# Patient Record
Sex: FEMALE | Race: WHITE | NOT HISPANIC OR LATINO | Employment: UNEMPLOYED | URBAN - NONMETROPOLITAN AREA
[De-identification: names, ages, dates, MRNs, and addresses within clinical notes are randomized per-mention and may not be internally consistent; named-entity substitution may affect disease eponyms.]

---

## 2017-01-24 ENCOUNTER — APPOINTMENT (OUTPATIENT)
Dept: GENERAL RADIOLOGY | Facility: HOSPITAL | Age: 42
End: 2017-01-24

## 2017-01-24 ENCOUNTER — HOSPITAL ENCOUNTER (EMERGENCY)
Facility: HOSPITAL | Age: 42
Discharge: HOME | End: 2017-01-24
Attending: EMERGENCY MEDICINE | Admitting: EMERGENCY MEDICINE

## 2017-01-24 VITALS
HEIGHT: 66 IN | HEART RATE: 83 BPM | DIASTOLIC BLOOD PRESSURE: 82 MMHG | WEIGHT: 147 LBS | BODY MASS INDEX: 23.63 KG/M2 | RESPIRATION RATE: 18 BRPM | OXYGEN SATURATION: 100 % | TEMPERATURE: 98.7 F | SYSTOLIC BLOOD PRESSURE: 152 MMHG

## 2017-01-24 DIAGNOSIS — M54.12 CERVICAL RADICULOPATHY: ICD-10-CM

## 2017-01-24 DIAGNOSIS — L72.3 SEBACEOUS CYST: ICD-10-CM

## 2017-01-24 DIAGNOSIS — M54.2 NECK PAIN: Primary | ICD-10-CM

## 2017-01-24 PROCEDURE — 96372 THER/PROPH/DIAG INJ SC/IM: CPT

## 2017-01-24 PROCEDURE — 99283 EMERGENCY DEPT VISIT LOW MDM: CPT

## 2017-01-24 PROCEDURE — 25010000002 TRIAMCINOLONE PER 10 MG: Performed by: NURSE PRACTITIONER

## 2017-01-24 PROCEDURE — 72040 X-RAY EXAM NECK SPINE 2-3 VW: CPT

## 2017-01-24 RX ORDER — NAPROXEN 375 MG/1
375 TABLET ORAL 2 TIMES DAILY PRN
Qty: 14 TABLET | Refills: 0 | Status: SHIPPED | OUTPATIENT
Start: 2017-01-24 | End: 2017-10-06 | Stop reason: HOSPADM

## 2017-01-24 RX ORDER — CYCLOBENZAPRINE HCL 10 MG
10 TABLET ORAL 3 TIMES DAILY
Qty: 20 TABLET | Refills: 0 | Status: SHIPPED | OUTPATIENT
Start: 2017-01-24 | End: 2017-11-21

## 2017-01-24 RX ORDER — BUDESONIDE AND FORMOTEROL FUMARATE DIHYDRATE 160; 4.5 UG/1; UG/1
2 AEROSOL RESPIRATORY (INHALATION)
COMMUNITY

## 2017-01-24 RX ORDER — METHYLPREDNISOLONE 4 MG/1
TABLET ORAL
Qty: 21 TABLET | Refills: 0 | Status: SHIPPED | OUTPATIENT
Start: 2017-01-24 | End: 2017-10-06 | Stop reason: HOSPADM

## 2017-01-24 RX ORDER — CYCLOBENZAPRINE HCL 10 MG
10 TABLET ORAL ONCE
Status: COMPLETED | OUTPATIENT
Start: 2017-01-24 | End: 2017-01-24

## 2017-01-24 RX ORDER — ALBUTEROL SULFATE 90 UG/1
2 AEROSOL, METERED RESPIRATORY (INHALATION) DAILY
COMMUNITY
End: 2018-01-02 | Stop reason: SDUPTHER

## 2017-01-24 RX ORDER — TRIAMCINOLONE ACETONIDE 40 MG/ML
80 INJECTION, SUSPENSION INTRA-ARTICULAR; INTRAMUSCULAR ONCE
Status: COMPLETED | OUTPATIENT
Start: 2017-01-24 | End: 2017-01-24

## 2017-01-24 RX ADMIN — TRIAMCINOLONE ACETONIDE 80 MG: 40 INJECTION, SUSPENSION INTRA-ARTICULAR; INTRAMUSCULAR at 13:35

## 2017-01-24 RX ADMIN — CYCLOBENZAPRINE HYDROCHLORIDE 10 MG: 10 TABLET, FILM COATED ORAL at 13:35

## 2017-01-24 NOTE — DISCHARGE INSTRUCTIONS
Follow-up with a local surgeon if removal of the sebaceous cyst becomes pertinent.  Medications as directed for the neck pain and subsequent arm symptoms.  Follow-up with your primary care provider to monitor your recovery.  If symptoms persist may start the Medrol/Dosepak on Thursday morning.  Thank you

## 2017-01-24 NOTE — ED PROVIDER NOTES
Subjective   HPI Comments: Patient presents to the emergency department with intermittent pain in the neck tripped he is he has and his right arm with occasional tingling.  Patient is curious if the symptoms can be attributed to the sebaceous cyst she has also discovered in the superior trapezius.  She has no loss of strength or other neurosensory complaints.  She denies any trauma.  Drums are made worse by sleeping at night sometimes and made better by nothing that she has recognized.      History provided by:  Patient      Review of Systems   Constitutional: Negative.  Negative for diaphoresis and fever.   HENT: Negative for sore throat.    Eyes: Negative.  Negative for pain and visual disturbance.   Respiratory: Negative for cough, shortness of breath, wheezing and stridor.    Cardiovascular: Negative.  Negative for chest pain.   Gastrointestinal: Negative.  Negative for abdominal pain, diarrhea, nausea and vomiting.   Endocrine: Negative.    Genitourinary: Negative.  Negative for dysuria.   Musculoskeletal: Positive for neck pain. Negative for back pain.   Skin: Negative.  Negative for pallor and rash.   Allergic/Immunologic: Negative.    Neurological: Positive for numbness. Negative for tremors, syncope, weakness, light-headedness and headaches.   Hematological: Negative.    Psychiatric/Behavioral: Negative.  Negative for agitation.   All other systems reviewed and are negative.      Past Medical History   Diagnosis Date   • Anemia    • Anxiety    • Asthma    • Blood clot in vein    • History of blood transfusion    • Hypertension    • Kidney disease    • Reflux esophagitis        No Known Allergies    Past Surgical History   Procedure Laterality Date   •  section     • Hysterectomy         Family History   Problem Relation Age of Onset   • Hypertension Mother    • Diabetes Father        Social History     Social History   • Marital status: Legally      Spouse name: N/A   • Number of children:  N/A   • Years of education: N/A     Social History Main Topics   • Smoking status: Current Every Day Smoker     Packs/day: 1.00     Types: Cigarettes   • Smokeless tobacco: Never Used   • Alcohol use No   • Drug use: No   • Sexual activity: Not Asked     Other Topics Concern   • None     Social History Narrative   • None           Objective   Physical Exam   Constitutional: She is oriented to person, place, and time. She appears well-developed.   HENT:   Head: Normocephalic.   Eyes: EOM are normal. Pupils are equal, round, and reactive to light.   Neck: Normal range of motion. Neck supple.   Cardiovascular: Normal rate and regular rhythm.    Pulmonary/Chest: Effort normal. She has no wheezes. She has no rales.   Abdominal: Soft. Bowel sounds are normal. She exhibits no distension. There is no rebound and no guarding.   Musculoskeletal: Normal range of motion. She exhibits no edema.   There is a deep and small sebaceous cyst palpable over the superior trapezius.  It is approximately 1 cm and there is no local redness fluctuance.  There is mild tenderness to the local cervical muscles without significant midline spine tenderness.  Neurovascular exam and motor exams are negative to the arms.   Neurological: She is alert and oriented to person, place, and time.   Skin: Skin is warm and dry.   Psychiatric: She has a normal mood and affect.   Nursing note and vitals reviewed.      Procedures         ED Course  ED Course                  MDM  Number of Diagnoses or Management Options  Cervical radiculopathy:   Sebaceous cyst:      Amount and/or Complexity of Data Reviewed  Tests in the radiology section of CPT®: ordered and reviewed        Final diagnoses:   Sebaceous cyst   Cervical radiculopathy            Gerri Burger, APRN  01/24/17 1411

## 2017-06-17 ENCOUNTER — APPOINTMENT (OUTPATIENT)
Dept: GENERAL RADIOLOGY | Facility: HOSPITAL | Age: 42
End: 2017-06-17

## 2017-06-17 ENCOUNTER — HOSPITAL ENCOUNTER (EMERGENCY)
Facility: HOSPITAL | Age: 42
Discharge: HOME | End: 2017-06-17
Attending: EMERGENCY MEDICINE | Admitting: EMERGENCY MEDICINE

## 2017-06-17 VITALS
BODY MASS INDEX: 25.71 KG/M2 | DIASTOLIC BLOOD PRESSURE: 84 MMHG | TEMPERATURE: 98.4 F | HEART RATE: 88 BPM | HEIGHT: 66 IN | RESPIRATION RATE: 18 BRPM | SYSTOLIC BLOOD PRESSURE: 131 MMHG | OXYGEN SATURATION: 97 % | WEIGHT: 160 LBS

## 2017-06-17 DIAGNOSIS — J44.1 COPD EXACERBATION (HCC): ICD-10-CM

## 2017-06-17 DIAGNOSIS — J20.9 ACUTE BRONCHITIS, UNSPECIFIED ORGANISM: ICD-10-CM

## 2017-06-17 DIAGNOSIS — R07.89 CHEST WALL PAIN: Primary | ICD-10-CM

## 2017-06-17 LAB
ALBUMIN SERPL-MCNC: 4.5 G/DL (ref 3.5–5)
ALBUMIN/GLOB SERPL: 1.6 G/DL (ref 1–2)
ALP SERPL-CCNC: 54 U/L (ref 38–126)
ALT SERPL W P-5'-P-CCNC: 18 U/L (ref 13–69)
ANION GAP SERPL CALCULATED.3IONS-SCNC: 17.7 MMOL/L
AST SERPL-CCNC: 17 U/L (ref 15–46)
BASOPHILS # BLD AUTO: 0.09 10*3/MM3 (ref 0–0.2)
BASOPHILS NFR BLD AUTO: 0.7 % (ref 0–2.5)
BILIRUB SERPL-MCNC: 0.5 MG/DL (ref 0.2–1.3)
BUN BLD-MCNC: 11 MG/DL (ref 7–20)
BUN/CREAT SERPL: 10 (ref 7.1–23.5)
CALCIUM SPEC-SCNC: 9.2 MG/DL (ref 8.4–10.2)
CHLORIDE SERPL-SCNC: 107 MMOL/L (ref 98–107)
CO2 SERPL-SCNC: 22 MMOL/L (ref 26–30)
CREAT BLD-MCNC: 1.1 MG/DL (ref 0.6–1.3)
DEPRECATED RDW RBC AUTO: 42.7 FL (ref 37–54)
EOSINOPHIL # BLD AUTO: 0.13 10*3/MM3 (ref 0–0.7)
EOSINOPHIL NFR BLD AUTO: 1 % (ref 0–7)
ERYTHROCYTE [DISTWIDTH] IN BLOOD BY AUTOMATED COUNT: 12.4 % (ref 11.5–14.5)
GFR SERPL CREATININE-BSD FRML MDRD: 54 ML/MIN/1.73
GLOBULIN UR ELPH-MCNC: 2.9 GM/DL
GLUCOSE BLD-MCNC: 110 MG/DL (ref 74–98)
HCT VFR BLD AUTO: 42.8 % (ref 37–47)
HGB BLD-MCNC: 14.5 G/DL (ref 12–16)
HOLD SPECIMEN: NORMAL
HOLD SPECIMEN: NORMAL
IMM GRANULOCYTES # BLD: 0.08 10*3/MM3 (ref 0–0.06)
IMM GRANULOCYTES NFR BLD: 0.6 % (ref 0–0.6)
LYMPHOCYTES # BLD AUTO: 4.22 10*3/MM3 (ref 0.6–3.4)
LYMPHOCYTES NFR BLD AUTO: 31.5 % (ref 10–50)
MCH RBC QN AUTO: 31.6 PG (ref 27–31)
MCHC RBC AUTO-ENTMCNC: 33.9 G/DL (ref 30–37)
MCV RBC AUTO: 93.2 FL (ref 81–99)
MONOCYTES # BLD AUTO: 0.76 10*3/MM3 (ref 0–0.9)
MONOCYTES NFR BLD AUTO: 5.7 % (ref 0–12)
NEUTROPHILS # BLD AUTO: 8.11 10*3/MM3 (ref 2–6.9)
NEUTROPHILS NFR BLD AUTO: 60.5 % (ref 37–80)
NRBC BLD MANUAL-RTO: 0 /100 WBC (ref 0–0)
PLATELET # BLD AUTO: 221 10*3/MM3 (ref 130–400)
PMV BLD AUTO: 10.6 FL (ref 6–12)
POTASSIUM BLD-SCNC: 3.7 MMOL/L (ref 3.5–5.1)
PROT SERPL-MCNC: 7.4 G/DL (ref 6.3–8.2)
RBC # BLD AUTO: 4.59 10*6/MM3 (ref 4.2–5.4)
SODIUM BLD-SCNC: 143 MMOL/L (ref 137–145)
TROPONIN I SERPL-MCNC: 0 NG/ML (ref 0–0.05)
TROPONIN I SERPL-MCNC: <0.012 NG/ML (ref 0–0.03)
WBC NRBC COR # BLD: 13.39 10*3/MM3 (ref 4.8–10.8)
WHOLE BLOOD HOLD SPECIMEN: NORMAL
WHOLE BLOOD HOLD SPECIMEN: NORMAL

## 2017-06-17 PROCEDURE — 94640 AIRWAY INHALATION TREATMENT: CPT

## 2017-06-17 PROCEDURE — 25010000002 METHYLPREDNISOLONE PER 40 MG: Performed by: PHYSICIAN ASSISTANT

## 2017-06-17 PROCEDURE — 99283 EMERGENCY DEPT VISIT LOW MDM: CPT

## 2017-06-17 PROCEDURE — 96375 TX/PRO/DX INJ NEW DRUG ADDON: CPT

## 2017-06-17 PROCEDURE — 25010000002 KETOROLAC TROMETHAMINE PER 15 MG: Performed by: PHYSICIAN ASSISTANT

## 2017-06-17 PROCEDURE — 84484 ASSAY OF TROPONIN QUANT: CPT | Performed by: EMERGENCY MEDICINE

## 2017-06-17 PROCEDURE — 94799 UNLISTED PULMONARY SVC/PX: CPT

## 2017-06-17 PROCEDURE — 93005 ELECTROCARDIOGRAM TRACING: CPT | Performed by: EMERGENCY MEDICINE

## 2017-06-17 PROCEDURE — 71010 HC CHEST PA OR AP: CPT

## 2017-06-17 PROCEDURE — 85025 COMPLETE CBC W/AUTO DIFF WBC: CPT | Performed by: EMERGENCY MEDICINE

## 2017-06-17 PROCEDURE — 80053 COMPREHEN METABOLIC PANEL: CPT | Performed by: EMERGENCY MEDICINE

## 2017-06-17 PROCEDURE — 96374 THER/PROPH/DIAG INJ IV PUSH: CPT

## 2017-06-17 RX ORDER — PREDNISONE 20 MG/1
60 TABLET ORAL DAILY
Qty: 12 TABLET | Refills: 0 | Status: SHIPPED | OUTPATIENT
Start: 2017-06-17 | End: 2017-10-06 | Stop reason: HOSPADM

## 2017-06-17 RX ORDER — IPRATROPIUM BROMIDE AND ALBUTEROL SULFATE 2.5; .5 MG/3ML; MG/3ML
3 SOLUTION RESPIRATORY (INHALATION) ONCE
Status: COMPLETED | OUTPATIENT
Start: 2017-06-17 | End: 2017-06-17

## 2017-06-17 RX ORDER — AZITHROMYCIN 250 MG/1
250 TABLET, FILM COATED ORAL DAILY
Qty: 6 TABLET | Refills: 0 | Status: SHIPPED | OUTPATIENT
Start: 2017-06-17 | End: 2017-10-06 | Stop reason: HOSPADM

## 2017-06-17 RX ORDER — KETOROLAC TROMETHAMINE 30 MG/ML
30 INJECTION, SOLUTION INTRAMUSCULAR; INTRAVENOUS ONCE
Status: COMPLETED | OUTPATIENT
Start: 2017-06-17 | End: 2017-06-17

## 2017-06-17 RX ORDER — MELOXICAM 15 MG/1
15 TABLET ORAL DAILY
Qty: 7 TABLET | Refills: 0 | Status: SHIPPED | OUTPATIENT
Start: 2017-06-17 | End: 2017-11-21

## 2017-06-17 RX ORDER — ASPIRIN 325 MG
325 TABLET ORAL ONCE
Status: DISCONTINUED | OUTPATIENT
Start: 2017-06-17 | End: 2017-06-17

## 2017-06-17 RX ORDER — METHYLPREDNISOLONE SODIUM SUCCINATE 40 MG/ML
80 INJECTION, POWDER, LYOPHILIZED, FOR SOLUTION INTRAMUSCULAR; INTRAVENOUS ONCE
Status: COMPLETED | OUTPATIENT
Start: 2017-06-17 | End: 2017-06-17

## 2017-06-17 RX ORDER — SODIUM CHLORIDE 0.9 % (FLUSH) 0.9 %
10 SYRINGE (ML) INJECTION AS NEEDED
Status: DISCONTINUED | OUTPATIENT
Start: 2017-06-17 | End: 2017-06-17 | Stop reason: HOSPADM

## 2017-06-17 RX ADMIN — IPRATROPIUM BROMIDE AND ALBUTEROL SULFATE 3 ML: .5; 3 SOLUTION RESPIRATORY (INHALATION) at 15:42

## 2017-06-17 RX ADMIN — KETOROLAC TROMETHAMINE 30 MG: 30 INJECTION, SOLUTION INTRAMUSCULAR at 16:25

## 2017-06-17 RX ADMIN — METHYLPREDNISOLONE SODIUM SUCCINATE 80 MG: 40 INJECTION, POWDER, FOR SOLUTION INTRAMUSCULAR; INTRAVENOUS at 16:24

## 2017-06-17 NOTE — DISCHARGE INSTRUCTIONS
Continue your inhalers.  Discontinue cigarettes.  Return to the ER for worsening chest pain, worsening shortness of breath, passing out, new or worsening symptoms.  Follow-up with your doctor on Monday for further workup, treatment and evaluation.

## 2017-06-17 NOTE — ED PROVIDER NOTES
Subjective   HPI Comments: 41-year-old white female with no previous cardiac history other than a negative nuclear stress test in August 2016 with Dr. Curtis.  I have reviewed the results myself.  She does have a history of COPD and continued tobacco use.  She is here with a several year history of cough, worse for the past 5 days.  She is coughing up green, clear and yellow sputum as well as short of breath and having some right-sided chest pressure-like discomfort for the past 5 days constant in nature.  Worse with deep breathing and coughing.  The pain radiates across her sternum into her left chest since yesterday.  The chest discomfort is mild to moderate in severity.  No arm, back or neck radiation.  Some associated shortness of breath without nausea, vomiting or diaphoresis.  Her cardiac risk factors include smoking, hypertension.  No history of DVT or PE.  Status post hysterectomy.    Aggravating factors: Coughing.  Alleviating factors: None.  Treatment prior to arrival: Albuterol and Symbicort inhalers.      History provided by:  Patient  History limited by: nothing.   used: No        Review of Systems   Constitutional: Negative.    HENT: Negative.    Eyes: Negative.    Respiratory: Positive for cough, shortness of breath and wheezing.    Cardiovascular: Positive for chest pain.   Gastrointestinal: Negative.  Negative for abdominal pain.   Endocrine: Negative.    Genitourinary: Negative for dysuria.   Musculoskeletal: Negative.    Skin: Negative.    Allergic/Immunologic: Negative.    Neurological: Negative.    Hematological: Negative.    Psychiatric/Behavioral: Negative.    All other systems reviewed and are negative.      Past Medical History:   Diagnosis Date   • Anemia    • Anxiety    • Asthma    • Blood clot in vein    • History of blood transfusion    • Hypertension    • Kidney disease    • Reflux esophagitis        No Known Allergies    Past Surgical History:   Procedure  Laterality Date   •  SECTION     • HYSTERECTOMY         Family History   Problem Relation Age of Onset   • Hypertension Mother    • Diabetes Father        Social History     Social History   • Marital status: Legally      Spouse name: N/A   • Number of children: N/A   • Years of education: N/A     Social History Main Topics   • Smoking status: Current Every Day Smoker     Packs/day: 1.00     Types: Cigarettes   • Smokeless tobacco: Never Used   • Alcohol use No   • Drug use: No   • Sexual activity: Not Asked     Other Topics Concern   • None     Social History Narrative           Objective   Physical Exam   Constitutional: She is oriented to person, place, and time. She appears well-developed and well-nourished. No distress.   HENT:   Head: Normocephalic and atraumatic.   Right Ear: External ear normal.   Left Ear: External ear normal.   Eyes: EOM are normal. Pupils are equal, round, and reactive to light.   Neck: Normal range of motion. Neck supple.   Cardiovascular: Normal rate, regular rhythm and normal heart sounds.    Pulmonary/Chest: No stridor. She has wheezes. She exhibits tenderness.   The right chest wall is exquisitely tender to palpation as is the left side as well.  The patient is wheezing throughout.  She appears to be in mild respiratory distress.   Abdominal: Soft. She exhibits no distension. There is no tenderness. There is no rebound and no guarding.   Musculoskeletal: Normal range of motion. She exhibits no edema.   No calf tenderness or ankle edema.   Neurological: She is alert and oriented to person, place, and time.   Skin: Skin is warm and dry. No rash noted. She is not diaphoretic.   Psychiatric: She has a normal mood and affect. Her behavior is normal. Judgment and thought content normal.   Nursing note and vitals reviewed.      ECG 12 Lead    Date/Time: 2017 3:47 PM  Performed by: DEANDRE KNOX  Authorized by: SACHIN ALONSO   Comments: EKG: Sinus rhythm at rate  of 98.  No ischemia or infarction.  Normal axis and intervals.  No ectopy.               ED Course  ED Course   Comment By Time   Longer wheezing.  Chest pain is nearly gone.  Feels much better.  Clinically the patient has bronchitis, COPD exacerbation and chest wall pain.  We will treat for this.  She is perc rule negative.  We therefore did not order a d-dimer for that reason. Tray Nixon PA-C 06/17 1653                  MDM  Number of Diagnoses or Management Options  Acute bronchitis, unspecified organism: new and requires workup  Chest wall pain: new and requires workup  COPD exacerbation: new and requires workup  Diagnosis management comments: 5 days of constant chest pain with negative EKG and cardiac enzymes.  One set of cardiac enzymes is sufficient to rule out a myocardial infarction.  We'll have her follow-up with her family doctor on Monday for further workup, treatment and evaluation.       Amount and/or Complexity of Data Reviewed  Clinical lab tests: reviewed and ordered  Tests in the radiology section of CPT®: ordered and reviewed  Tests in the medicine section of CPT®: ordered and reviewed  Decide to obtain previous medical records or to obtain history from someone other than the patient: yes  Review and summarize past medical records: yes  Discuss the patient with other providers: yes  Independent visualization of images, tracings, or specimens: yes    Risk of Complications, Morbidity, and/or Mortality  Presenting problems: moderate  Diagnostic procedures: low  Management options: moderate    Patient Progress  Patient progress: stable      Final diagnoses:   Acute bronchitis, unspecified organism   Chest wall pain   COPD exacerbation            Tray Nixon PA-C  06/17/17 0415

## 2017-07-30 ENCOUNTER — HOSPITAL ENCOUNTER (EMERGENCY)
Facility: HOSPITAL | Age: 42
Discharge: HOME | End: 2017-07-30
Attending: EMERGENCY MEDICINE | Admitting: EMERGENCY MEDICINE

## 2017-07-30 VITALS
BODY MASS INDEX: 25.71 KG/M2 | RESPIRATION RATE: 18 BRPM | HEIGHT: 66 IN | HEART RATE: 95 BPM | WEIGHT: 160 LBS | TEMPERATURE: 98.5 F | SYSTOLIC BLOOD PRESSURE: 136 MMHG | DIASTOLIC BLOOD PRESSURE: 82 MMHG | OXYGEN SATURATION: 96 %

## 2017-07-30 DIAGNOSIS — T63.461A WASP STING, ACCIDENTAL OR UNINTENTIONAL, INITIAL ENCOUNTER: Primary | ICD-10-CM

## 2017-07-30 PROCEDURE — 99283 EMERGENCY DEPT VISIT LOW MDM: CPT

## 2017-07-30 PROCEDURE — 63710000001 DIPHENHYDRAMINE PER 50 MG: Performed by: EMERGENCY MEDICINE

## 2017-07-30 RX ORDER — FAMOTIDINE 20 MG/1
20 TABLET, FILM COATED ORAL ONCE
Status: COMPLETED | OUTPATIENT
Start: 2017-07-30 | End: 2017-07-30

## 2017-07-30 RX ORDER — PREDNISONE 20 MG/1
20 TABLET ORAL 2 TIMES DAILY
Qty: 6 TABLET | Refills: 0 | Status: SHIPPED | OUTPATIENT
Start: 2017-07-30 | End: 2017-08-02

## 2017-07-30 RX ORDER — CEPHALEXIN 500 MG/1
500 CAPSULE ORAL 3 TIMES DAILY
Qty: 21 CAPSULE | Refills: 0 | Status: SHIPPED | OUTPATIENT
Start: 2017-07-30 | End: 2017-08-06

## 2017-07-30 RX ORDER — DIPHENHYDRAMINE HCL 25 MG
25 CAPSULE ORAL ONCE
Status: COMPLETED | OUTPATIENT
Start: 2017-07-30 | End: 2017-07-30

## 2017-07-30 RX ADMIN — DIPHENHYDRAMINE HYDROCHLORIDE 25 MG: 25 CAPSULE ORAL at 10:30

## 2017-07-30 RX ADMIN — FAMOTIDINE 20 MG: 20 TABLET, FILM COATED ORAL at 10:29

## 2017-10-05 ENCOUNTER — HOSPITAL ENCOUNTER (INPATIENT)
Facility: HOSPITAL | Age: 42
LOS: 1 days | Discharge: HOME | End: 2017-10-06
Attending: EMERGENCY MEDICINE | Admitting: INTERNAL MEDICINE

## 2017-10-05 ENCOUNTER — APPOINTMENT (OUTPATIENT)
Dept: ULTRASOUND IMAGING | Facility: HOSPITAL | Age: 42
End: 2017-10-05

## 2017-10-05 ENCOUNTER — APPOINTMENT (OUTPATIENT)
Dept: CT IMAGING | Facility: HOSPITAL | Age: 42
End: 2017-10-05

## 2017-10-05 ENCOUNTER — APPOINTMENT (OUTPATIENT)
Dept: GENERAL RADIOLOGY | Facility: HOSPITAL | Age: 42
End: 2017-10-05

## 2017-10-05 DIAGNOSIS — N17.9 ACUTE RENAL INJURY (HCC): ICD-10-CM

## 2017-10-05 DIAGNOSIS — R11.2 NON-INTRACTABLE VOMITING WITH NAUSEA, UNSPECIFIED VOMITING TYPE: Primary | ICD-10-CM

## 2017-10-05 PROBLEM — F10.20 ALCOHOLISM (HCC): Status: ACTIVE | Noted: 2017-10-05

## 2017-10-05 PROBLEM — J44.9 COPD (CHRONIC OBSTRUCTIVE PULMONARY DISEASE) (HCC): Status: ACTIVE | Noted: 2017-10-05

## 2017-10-05 PROBLEM — F41.9 ANXIETY AND DEPRESSION: Status: ACTIVE | Noted: 2017-10-05

## 2017-10-05 PROBLEM — F32.A ANXIETY AND DEPRESSION: Status: ACTIVE | Noted: 2017-10-05

## 2017-10-05 PROBLEM — I10 HTN (HYPERTENSION): Status: ACTIVE | Noted: 2017-10-05

## 2017-10-05 LAB
ALBUMIN SERPL-MCNC: 5.6 G/DL (ref 3.5–5)
ALBUMIN/GLOB SERPL: 1.4 G/DL (ref 1–2)
ALP SERPL-CCNC: 90 U/L (ref 38–126)
ALT SERPL W P-5'-P-CCNC: 31 U/L (ref 13–69)
AMORPH URATE CRY URNS QL MICRO: ABNORMAL /HPF
ANION GAP SERPL CALCULATED.3IONS-SCNC: 35.2 MMOL/L
APAP SERPL-MCNC: <10 MCG/ML
AST SERPL-CCNC: 34 U/L (ref 15–46)
BACTERIA UR QL AUTO: ABNORMAL /HPF
BASOPHILS # BLD AUTO: 0.09 10*3/MM3 (ref 0–0.2)
BASOPHILS NFR BLD AUTO: 0.5 % (ref 0–2.5)
BILIRUB SERPL-MCNC: 0.9 MG/DL (ref 0.2–1.3)
BUN BLD-MCNC: 24 MG/DL (ref 7–20)
BUN/CREAT SERPL: 12 (ref 7.1–23.5)
CALCIUM SPEC-SCNC: 11 MG/DL (ref 8.4–10.2)
CHLORIDE SERPL-SCNC: 99 MMOL/L (ref 98–107)
CO2 SERPL-SCNC: 6 MMOL/L (ref 26–30)
CREAT BLD-MCNC: 2 MG/DL (ref 0.6–1.3)
D-LACTATE SERPL-SCNC: 1 MMOL/L (ref 0.5–2)
DEPRECATED RDW RBC AUTO: 45.4 FL (ref 37–54)
EOSINOPHIL # BLD AUTO: 0.1 10*3/MM3 (ref 0–0.7)
EOSINOPHIL NFR BLD AUTO: 0.5 % (ref 0–7)
ERYTHROCYTE [DISTWIDTH] IN BLOOD BY AUTOMATED COUNT: 13.2 % (ref 11.5–14.5)
ETHANOL BLD-MCNC: <10 MG/DL
ETHANOL UR QL: <0.01 %
GFR SERPL CREATININE-BSD FRML MDRD: 27 ML/MIN/1.73
GLOBULIN UR ELPH-MCNC: 4 GM/DL
GLUCOSE BLD-MCNC: 168 MG/DL (ref 74–98)
HCT VFR BLD AUTO: 44.3 % (ref 37–47)
HGB BLD-MCNC: 15.1 G/DL (ref 12–16)
HOLD SPECIMEN: NORMAL
HOLD SPECIMEN: NORMAL
HYALINE CASTS UR QL AUTO: ABNORMAL /LPF
IMM GRANULOCYTES # BLD: 0.38 10*3/MM3 (ref 0–0.06)
IMM GRANULOCYTES NFR BLD: 2 % (ref 0–0.6)
LIPASE SERPL-CCNC: 404 U/L (ref 23–300)
LYMPHOCYTES # BLD AUTO: 2.66 10*3/MM3 (ref 0.6–3.4)
LYMPHOCYTES NFR BLD AUTO: 13.7 % (ref 10–50)
MCH RBC QN AUTO: 32.2 PG (ref 27–31)
MCHC RBC AUTO-ENTMCNC: 34.1 G/DL (ref 30–37)
MCV RBC AUTO: 94.5 FL (ref 81–99)
MONOCYTES # BLD AUTO: 1.08 10*3/MM3 (ref 0–0.9)
MONOCYTES NFR BLD AUTO: 5.6 % (ref 0–12)
NEUTROPHILS # BLD AUTO: 15.04 10*3/MM3 (ref 2–6.9)
NEUTROPHILS NFR BLD AUTO: 77.7 % (ref 37–80)
NRBC BLD MANUAL-RTO: 0 /100 WBC (ref 0–0)
PLATELET # BLD AUTO: 261 10*3/MM3 (ref 130–400)
PMV BLD AUTO: 11.4 FL (ref 6–12)
POTASSIUM BLD-SCNC: 5.2 MMOL/L (ref 3.5–5.1)
PROT SERPL-MCNC: 9.6 G/DL (ref 6.3–8.2)
RBC # BLD AUTO: 4.69 10*6/MM3 (ref 4.2–5.4)
RBC # UR: ABNORMAL /HPF
REF LAB TEST METHOD: ABNORMAL
SALICYLATES SERPL-MCNC: <1 MG/DL (ref 2.8–20)
SODIUM BLD-SCNC: 135 MMOL/L (ref 137–145)
SQUAMOUS #/AREA URNS HPF: ABNORMAL /HPF
TROPONIN I SERPL-MCNC: <0.012 NG/ML (ref 0–0.03)
WBC NRBC COR # BLD: 19.35 10*3/MM3 (ref 4.8–10.8)
WBC UR QL AUTO: ABNORMAL /HPF
WHOLE BLOOD HOLD SPECIMEN: NORMAL
WHOLE BLOOD HOLD SPECIMEN: NORMAL

## 2017-10-05 PROCEDURE — 84484 ASSAY OF TROPONIN QUANT: CPT | Performed by: EMERGENCY MEDICINE

## 2017-10-05 PROCEDURE — 25010000002 THIAMINE PER 100 MG: Performed by: INTERNAL MEDICINE

## 2017-10-05 PROCEDURE — 25010000002 LEVOFLOXACIN PER 250 MG: Performed by: INTERNAL MEDICINE

## 2017-10-05 PROCEDURE — 99223 1ST HOSP IP/OBS HIGH 75: CPT | Performed by: INTERNAL MEDICINE

## 2017-10-05 PROCEDURE — 99285 EMERGENCY DEPT VISIT HI MDM: CPT

## 2017-10-05 PROCEDURE — 93005 ELECTROCARDIOGRAM TRACING: CPT | Performed by: EMERGENCY MEDICINE

## 2017-10-05 PROCEDURE — 80053 COMPREHEN METABOLIC PANEL: CPT | Performed by: EMERGENCY MEDICINE

## 2017-10-05 PROCEDURE — 80307 DRUG TEST PRSMV CHEM ANLYZR: CPT | Performed by: EMERGENCY MEDICINE

## 2017-10-05 PROCEDURE — 81015 MICROSCOPIC EXAM OF URINE: CPT | Performed by: EMERGENCY MEDICINE

## 2017-10-05 PROCEDURE — 25010000002 MAGNESIUM SULFATE PER 500 MG OF MAGNESIUM: Performed by: INTERNAL MEDICINE

## 2017-10-05 PROCEDURE — 85025 COMPLETE CBC W/AUTO DIFF WBC: CPT | Performed by: EMERGENCY MEDICINE

## 2017-10-05 PROCEDURE — 83690 ASSAY OF LIPASE: CPT | Performed by: EMERGENCY MEDICINE

## 2017-10-05 PROCEDURE — 80307 DRUG TEST PRSMV CHEM ANLYZR: CPT | Performed by: INTERNAL MEDICINE

## 2017-10-05 PROCEDURE — 25010000002 ONDANSETRON PER 1 MG: Performed by: EMERGENCY MEDICINE

## 2017-10-05 PROCEDURE — 83605 ASSAY OF LACTIC ACID: CPT | Performed by: EMERGENCY MEDICINE

## 2017-10-05 PROCEDURE — 76705 ECHO EXAM OF ABDOMEN: CPT

## 2017-10-05 PROCEDURE — 25010000002 ENOXAPARIN PER 10 MG: Performed by: INTERNAL MEDICINE

## 2017-10-05 PROCEDURE — 25010000002 MORPHINE PER 10 MG: Performed by: EMERGENCY MEDICINE

## 2017-10-05 PROCEDURE — 71010 HC CHEST PA OR AP: CPT

## 2017-10-05 PROCEDURE — 74176 CT ABD & PELVIS W/O CONTRAST: CPT

## 2017-10-05 RX ORDER — ALBUTEROL SULFATE 2.5 MG/3ML
2.5 SOLUTION RESPIRATORY (INHALATION) EVERY 4 HOURS PRN
Status: DISCONTINUED | OUTPATIENT
Start: 2017-10-05 | End: 2017-10-06 | Stop reason: HOSPADM

## 2017-10-05 RX ORDER — ONDANSETRON 2 MG/ML
4 INJECTION INTRAMUSCULAR; INTRAVENOUS EVERY 6 HOURS PRN
Status: DISCONTINUED | OUTPATIENT
Start: 2017-10-05 | End: 2017-10-06 | Stop reason: HOSPADM

## 2017-10-05 RX ORDER — ONDANSETRON 2 MG/ML
4 INJECTION INTRAMUSCULAR; INTRAVENOUS ONCE
Status: COMPLETED | OUTPATIENT
Start: 2017-10-05 | End: 2017-10-05

## 2017-10-05 RX ORDER — MORPHINE SULFATE 2 MG/ML
2 INJECTION, SOLUTION INTRAMUSCULAR; INTRAVENOUS EVERY 4 HOURS PRN
Status: DISCONTINUED | OUTPATIENT
Start: 2017-10-05 | End: 2017-10-06 | Stop reason: HOSPADM

## 2017-10-05 RX ORDER — PANTOPRAZOLE SODIUM 40 MG/1
40 TABLET, DELAYED RELEASE ORAL DAILY
Status: DISCONTINUED | OUTPATIENT
Start: 2017-10-05 | End: 2017-10-06 | Stop reason: HOSPADM

## 2017-10-05 RX ORDER — LEVOFLOXACIN 5 MG/ML
500 INJECTION, SOLUTION INTRAVENOUS ONCE
Status: COMPLETED | OUTPATIENT
Start: 2017-10-05 | End: 2017-10-05

## 2017-10-05 RX ORDER — SODIUM BICARBONATE 650 MG/1
1300 TABLET ORAL 3 TIMES DAILY
Status: DISCONTINUED | OUTPATIENT
Start: 2017-10-05 | End: 2017-10-06 | Stop reason: HOSPADM

## 2017-10-05 RX ORDER — LORAZEPAM 0.5 MG/1
2 TABLET ORAL
Status: DISCONTINUED | OUTPATIENT
Start: 2017-10-05 | End: 2017-10-06 | Stop reason: HOSPADM

## 2017-10-05 RX ORDER — ONDANSETRON 2 MG/ML
4 INJECTION INTRAMUSCULAR; INTRAVENOUS ONCE
Status: DISCONTINUED | OUTPATIENT
Start: 2017-10-05 | End: 2017-10-06 | Stop reason: HOSPADM

## 2017-10-05 RX ORDER — SODIUM CHLORIDE 9 MG/ML
125 INJECTION, SOLUTION INTRAVENOUS CONTINUOUS
Status: DISCONTINUED | OUTPATIENT
Start: 2017-10-05 | End: 2017-10-05

## 2017-10-05 RX ORDER — LEVOFLOXACIN 5 MG/ML
250 INJECTION, SOLUTION INTRAVENOUS EVERY 24 HOURS
Status: DISCONTINUED | OUTPATIENT
Start: 2017-10-06 | End: 2017-10-06 | Stop reason: HOSPADM

## 2017-10-05 RX ORDER — MORPHINE SULFATE 4 MG/ML
4 INJECTION, SOLUTION INTRAMUSCULAR; INTRAVENOUS ONCE
Status: COMPLETED | OUTPATIENT
Start: 2017-10-05 | End: 2017-10-05

## 2017-10-05 RX ORDER — SODIUM CHLORIDE 0.9 % (FLUSH) 0.9 %
1-10 SYRINGE (ML) INJECTION AS NEEDED
Status: DISCONTINUED | OUTPATIENT
Start: 2017-10-05 | End: 2017-10-06 | Stop reason: HOSPADM

## 2017-10-05 RX ORDER — LEVOFLOXACIN 5 MG/ML
500 INJECTION, SOLUTION INTRAVENOUS EVERY 24 HOURS
Status: DISCONTINUED | OUTPATIENT
Start: 2017-10-05 | End: 2017-10-05

## 2017-10-05 RX ORDER — DIAZEPAM 5 MG/1
5 TABLET ORAL DAILY
Status: DISCONTINUED | OUTPATIENT
Start: 2017-10-05 | End: 2017-10-06 | Stop reason: HOSPADM

## 2017-10-05 RX ORDER — LORAZEPAM 0.5 MG/1
1 TABLET ORAL
Status: DISCONTINUED | OUTPATIENT
Start: 2017-10-05 | End: 2017-10-06 | Stop reason: HOSPADM

## 2017-10-05 RX ORDER — ACETAMINOPHEN 325 MG/1
650 TABLET ORAL EVERY 4 HOURS PRN
Status: DISCONTINUED | OUTPATIENT
Start: 2017-10-05 | End: 2017-10-06 | Stop reason: HOSPADM

## 2017-10-05 RX ORDER — ALBUTEROL SULFATE 90 UG/1
2 AEROSOL, METERED RESPIRATORY (INHALATION) EVERY 4 HOURS PRN
Status: DISCONTINUED | OUTPATIENT
Start: 2017-10-05 | End: 2017-10-05 | Stop reason: CLARIF

## 2017-10-05 RX ORDER — BUDESONIDE AND FORMOTEROL FUMARATE DIHYDRATE 160; 4.5 UG/1; UG/1
2 AEROSOL RESPIRATORY (INHALATION) DAILY
Status: DISCONTINUED | OUTPATIENT
Start: 2017-10-05 | End: 2017-10-06 | Stop reason: HOSPADM

## 2017-10-05 RX ORDER — LORAZEPAM 2 MG/ML
1 INJECTION INTRAMUSCULAR
Status: DISCONTINUED | OUTPATIENT
Start: 2017-10-05 | End: 2017-10-06 | Stop reason: HOSPADM

## 2017-10-05 RX ORDER — SODIUM CHLORIDE 9 MG/ML
150 INJECTION, SOLUTION INTRAVENOUS CONTINUOUS
Status: DISCONTINUED | OUTPATIENT
Start: 2017-10-05 | End: 2017-10-06 | Stop reason: HOSPADM

## 2017-10-05 RX ORDER — LORAZEPAM 2 MG/ML
2 INJECTION INTRAMUSCULAR
Status: DISCONTINUED | OUTPATIENT
Start: 2017-10-05 | End: 2017-10-06 | Stop reason: HOSPADM

## 2017-10-05 RX ORDER — NALOXONE HCL 0.4 MG/ML
0.4 VIAL (ML) INJECTION
Status: DISCONTINUED | OUTPATIENT
Start: 2017-10-05 | End: 2017-10-06 | Stop reason: HOSPADM

## 2017-10-05 RX ADMIN — SODIUM CHLORIDE 150 ML/HR: 9 INJECTION, SOLUTION INTRAVENOUS at 06:43

## 2017-10-05 RX ADMIN — LEVOFLOXACIN 500 MG: 5 INJECTION, SOLUTION INTRAVENOUS at 09:06

## 2017-10-05 RX ADMIN — ENOXAPARIN SODIUM 40 MG: 40 INJECTION SUBCUTANEOUS at 09:05

## 2017-10-05 RX ADMIN — SODIUM BICARBONATE: 84 INJECTION, SOLUTION INTRAVENOUS at 21:31

## 2017-10-05 RX ADMIN — ONDANSETRON 4 MG: 2 INJECTION INTRAMUSCULAR; INTRAVENOUS at 00:43

## 2017-10-05 RX ADMIN — SODIUM CHLORIDE 1000 ML: 9 INJECTION, SOLUTION INTRAVENOUS at 01:46

## 2017-10-05 RX ADMIN — PANTOPRAZOLE SODIUM 40 MG: 40 TABLET, DELAYED RELEASE ORAL at 09:05

## 2017-10-05 RX ADMIN — BUDESONIDE AND FORMOTEROL FUMARATE DIHYDRATE 2 PUFF: 160; 4.5 AEROSOL RESPIRATORY (INHALATION) at 11:37

## 2017-10-05 RX ADMIN — SODIUM CHLORIDE 125 ML/HR: 9 INJECTION, SOLUTION INTRAVENOUS at 03:59

## 2017-10-05 RX ADMIN — FOLIC ACID 100 ML/HR: 5 INJECTION, SOLUTION INTRAMUSCULAR; INTRAVENOUS; SUBCUTANEOUS at 09:05

## 2017-10-05 RX ADMIN — ACETAMINOPHEN 650 MG: 325 TABLET, FILM COATED ORAL at 21:31

## 2017-10-05 RX ADMIN — DIAZEPAM 5 MG: 5 TABLET ORAL at 17:11

## 2017-10-05 RX ADMIN — SODIUM CHLORIDE 1000 ML: 9 INJECTION, SOLUTION INTRAVENOUS at 00:42

## 2017-10-05 RX ADMIN — SODIUM BICARBONATE 650 MG TABLET 1300 MG: at 21:31

## 2017-10-05 RX ADMIN — METRONIDAZOLE 500 MG: 500 INJECTION, SOLUTION INTRAVENOUS at 20:15

## 2017-10-05 RX ADMIN — MORPHINE SULFATE 4 MG: 4 INJECTION, SOLUTION INTRAMUSCULAR; INTRAVENOUS at 00:44

## 2017-10-05 RX ADMIN — METRONIDAZOLE 500 MG: 500 INJECTION, SOLUTION INTRAVENOUS at 10:55

## 2017-10-05 RX ADMIN — ACETAMINOPHEN 650 MG: 325 TABLET, FILM COATED ORAL at 09:15

## 2017-10-05 NOTE — H&P
[facility]   HISTORY AND PHYSICAL      Name:  Agatha Avendano   Age:  42 y.o.  Sex:  female  :  1975  MRN:  [id number]   Visit Number:  [id number]  Admission Date:  10/5/2017  Date Of Service:  10/05/17  Primary Care Physician:  FREDRICK Patel    History Obtained From:    patient    Chief Complaint:     Nausea and vomiting     History Of Presenting Illness:      42-year-old  female with history of hypertension, COPD, nephrolithiasis and pancreatitis as well as alcoholism comes in with nausea times several days.  She then developed nausea and vomiting as well as abdominal pain yesterday.  Her abdominal pain is in the epigastric area, 10 out of 10 in intensity and radiates into the bilateral flanks.  She has had some chills with this.  She denies any hematemesis but she vomited several times yesterday.  She denies any diarrhea.  She does have some shortness of breath.  She denies any chest pressure.  She last had pancreatitis in August.  She claims she is only drinking every 2 weeks a half a pint of liquor although on her last visit she had admitted to daily use of liquor.  Her WBC count was 19.35 today.  Her creatinine is 2.00 with a baseline of 1.  CT abdomen was unremarkable.  She rates the nausea and the abdominal pain as severe.  Food seems to make it worse.  She also was hypotensive in the emergency room, requiring several boluses of IV fluid to keep her blood pressure above 90.  She does take Excedrin at home, advised her not to take this as well as not to drink alcohol.  She denies any dysuria or hematuria.  Alcohol level in the emergency room was negative.  Her lipase was 404.  Patient denies any anxiety or depression or suicidal ideation.    Review Of Systems:     General ROS: positive for  - chills  Psychological ROS: negative  Ophthalmic ROS: negative  ENT ROS: negative  Allergy and Immunology ROS: negative  Hematological and Lymphatic ROS:  negative  Endocrine ROS: negative  Breast ROS: negative  Respiratory ROS: positive for - shortness of breath  Cardiovascular ROS: negative  Gastrointestinal ROS: positive for - nausea/vomiting  Genito-Urinary ROS: negative  Musculoskeletal ROS: negative  Neurological ROS: negative  Dermatological ROS: negative       Past Medical History:    Hypertension, COPD, nephrolithiasis, pancreatitis, alcoholism    Past Surgical history:    , total abdominal hysterectomy, finger surgery      Social History:    1 pack per day ×30 years of smoking, half pint liquor every 2 weeks she claims although last visit she was using on a daily basis, denies any drugs.  Has 3 living children with one child  from congenital heart disease.    Family History:    Father has diabetes, mother has diabetes, son had Down syndrome as well as congenital heart disease and congestive heart failure.    Allergies:      Review of patient's allergies indicates no known allergies.    Home Medications:    Prior to Admission Medications     Prescriptions Last Dose Informant Patient Reported? Taking?    albuterol (PROVENTIL HFA;VENTOLIN HFA) 108 (90 BASE) MCG/ACT inhaler   Yes No    Inhale 2 puffs Daily.    Aspirin-Acetaminophen-Caffeine (EXCEDRIN MIGRAINE PO)   Yes No    Take  by mouth.    azithromycin (ZITHROMAX) 250 MG tablet   No No    Take 1 tablet by mouth Daily. Take 2 tablets the first day, then 1 tablet daily for 4 days.    budesonide-formoterol (SYMBICORT) 160-4.5 MCG/ACT inhaler   Yes No    Inhale 2 puffs Daily.    cyclobenzaprine (FLEXERIL) 10 MG tablet   No No    Take 1 tablet by mouth 3 (Three) Times a Day.    lisinopril (PRINIVIL,ZESTRIL) 10 MG tablet   Yes No    Take 10 mg by mouth Daily.    meloxicam (MOBIC) 15 MG tablet   No No    Take 1 tablet by mouth Daily.    MethylPREDNISolone (MEDROL, SREEKANTH,) 4 MG tablet   No No    Take as directed on package instructions.    naproxen (NAPROSYN) 375 MG tablet   No No    Take 1 tablet by  mouth 2 (Two) Times a Day As Needed for mild pain (1-3).    pantoprazole (PROTONIX) 20 MG EC tablet   Yes No    Take 20 mg by mouth Daily.    predniSONE (DELTASONE) 20 MG tablet   No No    Take 3 tablets by mouth Daily.    QUEtiapine (SEROquel) 100 MG tablet   Yes No    Take 100 mg by mouth Every Night.             Hospital Scheduled Meds:      ondansetron 4 mg Intravenous Once         sodium chloride 125 mL/hr Last Rate: 999 mL/hr (10/05/17 0520)        Vital Signs:    Temp:  [97.9 °F (36.6 °C)-98 °F (36.7 °C)] 97.9 °F (36.6 °C)  Heart Rate:  [] 108  Resp:  [18-20] 18  BP: ()/(42-69) 93/46    Last 3 weights    10/05/17  0015   Weight: 182 lb (82.6 kg)       Body mass index is 29.38 kg/(m^2).    Physical Exam:      General Appearance:    Alert, cooperative, in no acute distress   Head:    Normocephalic, without obvious abnormality, atraumatic   Eyes:            Lids and lashes normal, conjunctivae and sclerae normal, no   icterus, no pallor, corneas clear, PERRLA   Ears:    Ears appear intact with no abnormalities noted   Throat:   No oral lesions, no thrush, oral mucosa moist   Neck:   No adenopathy, supple, trachea midline, no thyromegaly, no     carotid bruit, no JVD   Back:     No kyphosis present, no scoliosis present, no skin lesions,       erythema or scars, no tenderness to percussion or                   palpation,   range of motion normal   Lungs:     Clear to auscultation,respirations regular, even and                   unlabored    Heart:    Regular rhythm and normal rate, normal S1 and S2, no            murmur, no gallop, no rub, no click   Breast Exam:    Deferred   Abdomen:     Normal bowel sounds, no masses, no organomegaly, soft        non-tender, non-distended, no guarding, no rebound                 tenderness   Genitalia:    Deferred   Extremities:   Moves all extremities well, no edema, no cyanosis, no              redness   Pulses:   Pulses palpable and equal bilaterally   Skin:    No bleeding, bruising or rash   Lymph nodes:   No palpable adenopathy   Neurologic:   Cranial nerves 2 - 12 grossly intact, sensation intact, DTR        present and equal bilaterally         EKG:      Sinus tachycardia with no acute ST-T changes.    Telemetry:      Sinus tachycardia    I have personally looked at both the EKG and the telemetry strips.    Labs:    Results from last 7 days  Lab Units 10/05/17  0130 10/05/17  0028   LACTATE mmol/L 1.0  --    WBC 10*3/mm3  --  19.35*   HEMOGLOBIN g/dL  --  15.1   HEMATOCRIT %  --  44.3   MCV fL  --  94.5   MCHC g/dL  --  34.1   PLATELETS 10*3/mm3  --  261           Results from last 7 days  Lab Units 10/05/17  0028   SODIUM mmol/L 135*   POTASSIUM mmol/L 5.2*   CHLORIDE mmol/L 99   CO2 mmol/L 6.0*   BUN mg/dL 24*   CREATININE mg/dL 2.00*   EGFR IF NONAFRICN AM mL/min/1.73 27*   CALCIUM mg/dL 11.0*   GLUCOSE mg/dL 168*   ALBUMIN g/dL 5.60*   BILIRUBIN mg/dL 0.9   ALK PHOS U/L 90   AST (SGOT) U/L 34   ALT (SGPT) U/L 31   Estimated Creatinine Clearance: 39.3 mL/min (by C-G formula based on Cr of 2).  No results found for: AMMONIA    Results from last 7 days  Lab Units 10/05/17  0028   TROPONIN I ng/mL <0.012         No results found for: HGBA1C  Lab Results   Component Value Date    TSH 4.04 08/10/2016     No results found for: PREGTESTUR, PREGSERUM, HCG, HCGQUANT  Pain Management Panel     There is no flowsheet data to display.                          Radiology:    Imaging Results (last 7 days)     Procedure Component Value Units Date/Time    CT Abdomen Pelvis Without Contrast [[id number]] Updated:  10/05/17 0236          Assessment:    1.  Acute renal failure  2.  Intractable nausea and vomiting  3.  High anion gap Metabolic acidosis suspect from dehydration/Renal failure  4.  Mild pancreatitis with elevated lipase   5.  Sinus tachycardia  6.  COPD  7.  Hypertension  8.  Current hypotension due to hypovolemia.  9.  Alcoholism  10.  Abdominal pain  11.  Elevated WBC,  doubt infection at this point.    Plan:     We'll give IV fluids at 150 cc per hour.  Serial labs.  Place on alcohol withdrawal protocol.  We'll replace multivitamins, folate, thiamine.  Check ultrasound of the gallbladder.  Hold her Zestril at the present time.  Would avoid NSAIDs.  Encouraged patient to avoid alcohol.  Check Tylenol as well as aspirin level.  We'll keep on clear liquids.  Patient already seems to be improving from the IV fluids.  Lovenox for DVT prophylaxis.  Anticipated stay is less than 2 midnights.    Stveen Aldana,   10/05/17  6:23 AM

## 2017-10-05 NOTE — ED NOTES
0459: DR. JAY CALLED PER DR. REYNOSO, VOICEMAIL LEFT TO CALL ED BACK.     Kalina Ramachandran  10/05/17 8348

## 2017-10-05 NOTE — PROGRESS NOTES
[facility]    Name:  Agatha Avendano   Age:  42 y.o.  Sex:  female  :  1975  MRN:  [id number]   Visit Number:  [id number]  Admission Date:  10/5/2017  Date Of Service:  10/05/17  Primary Care Physician:  FREDRICK Patel     LOS: 0 days :  Patient Care Team:  FREDRICK Patel as PCP - General:    Chief Complaint:      Weakness    Subjective / Interval History:     Patient with nausea, weakness, poor po intake, weakness, and diffuse muscle aches. Her HCO3 is 6 with creatinine of 2.0 and BUN of 24.  No diarrhea. White count is 19.3.  Her Lipase is 404.     I have reviewed labs/imaging/records from this hospitalization, including ER staff and admitting/attending physicians H/P's and progress notes to establish a comprehensive understanding of this patient's clinical hospital course, as well as to establish a transition of care appropriately.    Vital Signs:    Temp:  [97.9 °F (36.6 °C)-98.3 °F (36.8 °C)] 98.3 °F (36.8 °C)  Heart Rate:  [] 108  Resp:  [18-20] 18  BP: ()/(42-69) 93/46    Intake and output:    Intake/Output       10/05/17 0700 - 10/06/17 0659    Intake (ml) 100    Output (ml) --    Net (ml) 100          Physical Examination:    General Appearance:  Alert and cooperative, not in any acute distress.   Head:  Atraumatic and normocephalic, without obvious abnormality.   Eyes:      PERRLA, Extra-occular movements are within normal limits.   Lungs:   Chest shape is normal. Breath sounds heard bilaterally equally.  No crackles or wheezing.   Heart:  Normal S1 and S2, no murmur, no gallop, no rub.   Abdomen:   Normal bowel sounds,  Soft non-tender, non-distended, no guarding, no rebound tenderness   Extremities: No edema                     Laboratory results:      Results from last 7 days  Lab Units 10/05/17  0028   SODIUM mmol/L 135*   POTASSIUM mmol/L 5.2*   CHLORIDE mmol/L 99   CO2 mmol/L 6.0*   BUN mg/dL 24*   CREATININE mg/dL  2.00*   CALCIUM mg/dL 11.0*   BILIRUBIN mg/dL 0.9   ALK PHOS U/L 90   ALT (SGPT) U/L 31   AST (SGOT) U/L 34   GLUCOSE mg/dL 168*       Results from last 7 days  Lab Units 10/05/17  0028   WBC 10*3/mm3 19.35*   HEMOGLOBIN g/dL 15.1   HEMATOCRIT % 44.3   PLATELETS 10*3/mm3 261       Results from last 7 days  Lab Units 10/05/17  0028   TROPONIN I ng/mL <0.012           I have reviewed the patient's laboratory results.    Radiology results:    Imaging Results (last 24 hours)     Procedure Component Value Units Date/Time    CT Abdomen Pelvis Without Contrast [[id number]] Collected:  10/05/17 0657     Updated:  10/05/17 0704    Narrative:       PROCEDURE: CT ABDOMEN PELVIS WO CONTRAST-     HISTORY: abdominal pain     COMPARISON: October 16, 2016.     PROCEDURE: Axial images were obtained from the lung bases through the  pubic symphysis without intravenous contrast.    .      FINDINGS:      ABDOMEN: Lack of intravenous contrast limits evaluation of the solid  organs, the mediastinum, and the vasculature. There is no  nephrolithiasis. There is no hydronephrosis. The limited noncontrast  images of the liver are normal. The gallbladder is unremarkable. . The  spleen is normal. No adrenal masses are seen.  The pancreas has an  unremarkable unenhanced appearance.. The aorta is normal in caliber.  There is no significant free fluid or adenopathy.  Limited noncontrast  images of the bowel are unremarkable. Nodular density within the lateral  lingula measuring up to 8 mm, this may be secondary to atelectasis.  Follow-up chest CT in 3 months is recommended to prior stability..      PELVIS: The appendix is normal. The urinary bladder is unremarkable.  There is no significant fluid or adenopathy. The osseous structures are  intact. . The uterus is surgically absent.       Impression:       1. No hydronephrosis or nephrolithiasis.  2. 8 mm nodular density in the lateral lingula, while this may be  secondary to atelectasis, follow-up CT  in 3 months is recommended.                939.9 mGy.cm.  20.37 mGy     This study was performed with techniques to keep radiation doses as low  as reasonably achievable (ALARA). Individualized dose reduction  techniques using automated exposure control or adjustment of mA and/or  kV according to the patient size were employed.      This report was finalized on 10/5/2017 7:02 AM by Kayden Henderson DO.    US Gallbladder [[id number]] Collected:  10/05/17 1007     Updated:  10/05/17 1018    Narrative:       PROCEDURE: US GALLBLADDER-     HISTORY: abd pain, nausea; R11.2-Nausea with vomiting, unspecified;  N17.9-Acute kidney failure, unspecified     PROCEDURE: Ultrasound images of the right upper quadrant were obtained.     FINDINGS: Limited images of the pancreas are unremarkable. The liver  parenchyma is fatty infiltrated. The gallbladder is normal with no  shadowing stones or wall thickening.  There is no pericholecystic fluid.   The common duct measures 4.5 mm. Limited images of the right kidney are  unremarkable.       Impression:       Fatty infiltration of the liver.           Images were reviewed, interpreted, and dictated by Dr. Henderson.  Transcribed by Kathia Abbott PA-C.     This report was finalized on 10/5/2017 10:16 AM by Kayden Henderson DO.          I have reviewed the patient's radiology reports.    Medication Review:     I have reviewed the patients active and prn medications.       Assessment/Plan:  1.  Acute renal failure  2.  Intractable nausea and vomiting  3.  High anion gap Metabolic acidosis suspect from dehydration/Renal failure  4.  Mild pancreatitis with elevated lipase   5.  Sinus tachycardia  6.  COPD  7.  Hypertension  8.  Current hypotension due to hypovolemia.  9.  Alcoholism  10.  Abdominal pain  11.  Elevated WBC, doubt infection at this point.     Plan:      On IV fluids. Replace electrolytes accordingly. White count is elevated at 19 with no obvious source.  Will place on IV zosyn until UA  and CXR acquired. CT abdomen pelvis showed an 8 mm nodule density in the lateral lingula that will need followed up in three months.  RUQ US showed a fatty liver. Patient has been placed on alcohol withdrawal protocol.  Replace multivitamins, folate, thiamine.  Hold her Zestril at the present time.  Would avoid NSAIDs.  Encouraged patient to avoid alcohol.  Acetaminophen and aspirin levels are negative. Continue on clear liquids.  With antiemetics PRN.       Darrius Arvizu MD  10/05/17  12:21 PM

## 2017-10-05 NOTE — CONSULTS
[facility]      Nephrology Consultation    Referring Provider:   No ref. provider found    Reason for Consultation:    Acute Kidney Injury and associated problems       Subjective     Chief complaint   Chief Complaint   Patient presents with   • Abdominal Pain       History of present illness:    Patient is 43 yo female with multiple medical problems as listed below in the past medical history who presented to the ER yesterday with complaints of nausea and vomiting with associated abdominal pain that is worsened with food.  Patient with a h/o pancreatitis as well as nephrolithiasis.  She was found to be hypotensive requiring multiple fluid boluses in the ER and subsequently admitted for further management and treatment, for full details please see the H+P from dr. Aldana which was reviewed by me. Patient with elevated creatinine upon admission to 2.0mg/dl with hyperkalemia and acidosis and prior creatinine was 1.0mg/dl. Was consulted to manage the MARSHA and related issues.  She has been taking Excedrin as well as prescribed daily use of Mobic also.  I have reviewed labs/imaging/records from this hospitalization, including ER staff and admitting/attending physicians H/P's and progress notes to establish a comprehensive understanding of this patient's clinical hospital course, as well as to establish plan of care appropriately.   Past Medical History:   Diagnosis Date   • Anemia    • Anxiety    • Asthma    • Blood clot in vein    • COPD (chronic obstructive pulmonary disease)    • History of blood transfusion    • Hypertension    • Kidney disease    • Reflux esophagitis        Past Surgical History:   Procedure Laterality Date   •  SECTION     • HYSTERECTOMY         Family History   Problem Relation Age of Onset   • Hypertension Mother    • Diabetes Father           negative h/o ESRD     Social History   Substance Use Topics   • Smoking status: Current Every Day Smoker     Packs/day: 1.00  "    Types: Cigarettes   • Smokeless tobacco: Never Used   • Alcohol use Yes      Comment: \"1/2 pint of alcohol 1-2 x wk\"     Prescriptions Prior to Admission   Medication Sig Dispense Refill Last Dose   • albuterol (PROVENTIL HFA;VENTOLIN HFA) 108 (90 BASE) MCG/ACT inhaler Inhale 2 puffs Daily.   10/4/2017 at Unknown time   • Aspirin-Acetaminophen-Caffeine (EXCEDRIN MIGRAINE PO) Take  by mouth.   10/4/2017 at Unknown time   • budesonide-formoterol (SYMBICORT) 160-4.5 MCG/ACT inhaler Inhale 2 puffs Daily.   10/4/2017 at Unknown time   • lisinopril (PRINIVIL,ZESTRIL) 10 MG tablet Take 10 mg by mouth Daily.   10/4/2017 at Unknown time   • QUEtiapine (SEROquel) 100 MG tablet Take 100 mg by mouth Every Night.   Past Month at Unknown time   • azithromycin (ZITHROMAX) 250 MG tablet Take 1 tablet by mouth Daily. Take 2 tablets the first day, then 1 tablet daily for 4 days. 6 tablet 0    • cyclobenzaprine (FLEXERIL) 10 MG tablet Take 1 tablet by mouth 3 (Three) Times a Day. 20 tablet 0    • meloxicam (MOBIC) 15 MG tablet Take 1 tablet by mouth Daily. 7 tablet 0    • MethylPREDNISolone (MEDROL, SREEKANTH,) 4 MG tablet Take as directed on package instructions. 21 tablet 0    • naproxen (NAPROSYN) 375 MG tablet Take 1 tablet by mouth 2 (Two) Times a Day As Needed for mild pain (1-3). 14 tablet 0    • pantoprazole (PROTONIX) 20 MG EC tablet Take 20 mg by mouth Daily.   Unknown   • predniSONE (DELTASONE) 20 MG tablet Take 3 tablets by mouth Daily. 12 tablet 0      Allergies:  Review of patient's allergies indicates no known allergies.    Review of Systems  Constitutional: Positive for fever and chills, no diaphoresis, fatigue and unexpected weight change.   HENT: Negative for congestion and hearing loss.   Eyes: Negative for redness and visual disturbance.   Respiratory: positive for shortness of breath. Negative for chest pain . Negative for cough and chest tightness.   Cardiovascular: Negative for chest pain and palpitations. " "  Gastrointestinal: Negative for abdominal distention, blood in stool. Denies any constipation or diarrhea. Positive nausea vomiting and abdominal pain worsened with food  Endocrine: Negative for cold or heat intolerance.   Genitourinary: Negative for difficulty urinating, dysuria and frequency.   Musculoskeletal: Negative for arthralgias, back pain and myalgias.   Skin: Negative for color change, rash and wound.   Neurological: Negative for syncope, new onset weakness or numbness of any extremities. Denies any headaches.   Hematological: Negative for adenopathy. Does not bruise/bleed easily.   Psychiatric/Behavioral: Negative for confusion. The patient is not nervous/anxious.     Objective     Vital Signs  BP 93/46  Pulse 108  Temp 98.3 °F (36.8 °C)  Resp 18  Ht 66\" (167.6 cm)  Wt 180 lb (81.6 kg)  SpO2 98%  BMI 29.05 kg/m2            No intake or output data in the 24 hours ending 10/05/17 0911    Physical Exam:     General Appearance:   Alert, cooperative, in no acute distress.     Head:   Normocephalic, without obvious abnormality, atraumatic.     Eyes:       Normal, conjunctivae and sclerae, no icterus, no pallor, corneas clear, PERRLA        Throat:   Oral mucosa dry      Neck:  No adenopathy, supple, trachea midline, no thyromegaly, no carotid bruit, no JVD      Back:   No CVA tenderness on Percussion.     Lungs:    Clear to auscultation and fair air movement noted.      Heart:   Regular rhythm and normal rate, normal S1 and S2.       Abdomen:   Obese. Normal bowel sounds, no masses, no organomegaly, soft non-tender, non-distended, no guarding, no rebound tenderness        Extremities:  Moves all extremities, no edema, no cyanosis, no redness.     Pulses:  Pulses palpable and equal bilaterally but weak.     Skin:  No bleeding, bruising or rash        Neurologic:  Cranial nerves grossly intact, move all extremities             Results Review:  Lab Results (last 7 days)     Procedure Component Value " Units Date/Time    CBC & Differential [[id number]] Collected:  10/05/17 0028    Specimen:  Blood Updated:  10/05/17 0043    Narrative:       The following orders were created for panel order CBC & Differential.  Procedure                               Abnormality         Status                     ---------                               -----------         ------                     CBC Auto Differential[[id number]]        Abnormal            Final result                 Please view results for these tests on the individual orders.    CBC Auto Differential [[id number]]  (Abnormal) Collected:  10/05/17 0028    Specimen:  Blood Updated:  10/05/17 0043     WBC 19.35 (H) 10*3/mm3      RBC 4.69 10*6/mm3      Hemoglobin 15.1 g/dL      Hematocrit 44.3 %      MCV 94.5 fL      MCH 32.2 (H) pg      MCHC 34.1 g/dL      RDW 13.2 %      RDW-SD 45.4 fl      MPV 11.4 fL      Platelets 261 10*3/mm3      Neutrophil % 77.7 %      Lymphocyte % 13.7 %      Monocyte % 5.6 %      Eosinophil % 0.5 %      Basophil % 0.5 %      Immature Grans % 2.0 (H) %      Neutrophils, Absolute 15.04 (H) 10*3/mm3      Lymphocytes, Absolute 2.66 10*3/mm3      Monocytes, Absolute 1.08 (H) 10*3/mm3      Eosinophils, Absolute 0.10 10*3/mm3      Basophils, Absolute 0.09 10*3/mm3      Immature Grans, Absolute 0.38 (H) 10*3/mm3      nRBC 0.0 /100 WBC     Ethanol [[id number]] Collected:  10/05/17 0028    Specimen:  Blood Updated:  10/05/17 0105     Ethanol <10 mg/dL      Ethanol % <0.010 %     Narrative:       This result is for medical use only and should not be used for forensic purposes.    Troponin [[id number]]  (Normal) Collected:  10/05/17 0028    Specimen:  Blood Updated:  10/05/17 0105     Troponin I <0.012 ng/mL     Narrative:       Normal Patient Upper Reference Limit (URL) (99th Percentile)=0.03 ng/mL   Non-AMI Illness Reference Limit=0.03-0.11 ng/mL   AMI Confirmation=0.12 ng/mL and above    Lipase [[id number]]  (Abnormal) Collected:  10/05/17 0028     Specimen:  Blood Updated:  10/05/17 0105     Lipase 404 (H) U/L     Comprehensive Metabolic Panel [[id number]]  (Abnormal) Collected:  10/05/17 0028    Specimen:  Blood Updated:  10/05/17 0107     Glucose 168 (H) mg/dL      BUN 24 (H) mg/dL      Creatinine 2.00 (H) mg/dL      Sodium 135 (L) mmol/L      Potassium 5.2 (H) mmol/L      Chloride 99 mmol/L      CO2 6.0 (C) mmol/L      Calcium 11.0 (H) mg/dL      Total Protein 9.6 (H) g/dL      Albumin 5.60 (H) g/dL      ALT (SGPT) 31 U/L      AST (SGOT) 34 U/L      Alkaline Phosphatase 90 U/L      Total Bilirubin 0.9 mg/dL      eGFR Non African Amer 27 (L) mL/min/1.73      Globulin 4.0 gm/dL      A/G Ratio 1.4 g/dL      BUN/Creatinine Ratio 12.0     Anion Gap 35.2 mmol/L     Narrative:       Abnormal estimated GFR should be followed by more specific studies to confirm end stage chronic renal disease. The equation used for calculation may not be accurate for patients less than 19 years old, greater than 70 years old, patients at extremes of weight, malnutrition, or with acute renal dysfunction.    [place] Draw [[id number]] Collected:  10/05/17 0028    Specimen:  Blood Updated:  10/05/17 0131    Narrative:       The following orders were created for panel order [place] Draw.  Procedure                               Abnormality         Status                     ---------                               -----------         ------                     Light Blue Top[[id number]]                                   Final result               Lavender Top[[id number]]                                     Final result               Gold Top - SST[[id number]]                                   Final result               Green Top (No Gel)[[id number]]                               Final result                 Please view results for these tests on the individual orders.    Light Blue Top [[id number]] Collected:  10/05/17 0028    Specimen:  Blood Updated:  10/05/17 0131     Extra Tube hold for  add-on      Auto resulted       Lavender Top [[id number]] Collected:  10/05/17 0028    Specimen:  Blood Updated:  10/05/17 0131     Extra Tube hold for add-on      Auto resulted       Gold Top - SST [[id number]] Collected:  10/05/17 0028    Specimen:  Blood Updated:  10/05/17 0131     Extra Tube Hold for add-ons.      Auto resulted.       Green Top (No Gel) [[id number]] Collected:  10/05/17 0028    Specimen:  Blood Updated:  10/05/17 0131     Extra Tube Hold for add-ons.      Auto resulted.       Lactic Acid, Plasma [[id number]]  (Normal) Collected:  10/05/17 0130    Specimen:  Blood Updated:  10/05/17 0155     Lactate 1.0 mmol/L     Urinalysis, Microscopic Only - Urine, Clean Catch [[id number]]  (Abnormal) Collected:  10/05/17 0206    Specimen:  Urine from Urine, Clean Catch Updated:  10/05/17 0236     RBC, UA None Seen /HPF      WBC, UA None Seen /HPF      Bacteria, UA None Seen /HPF      Squamous Epithelial Cells, UA 31-50 (A) /HPF      Hyaline Casts, UA None Seen /LPF      Amorphous Crystals, UA Moderate/2+ /HPF      Methodology Manual Light Microscopy    Salicylate Level [[id number]]  (Abnormal) Collected:  10/05/17 0028    Specimen:  Blood Updated:  10/05/17 0657     Salicylate <1.0 (L) mg/dL     Acetaminophen Level [[id number]]  (Normal) Collected:  10/05/17 0028    Specimen:  Blood Updated:  10/05/17 0657     Acetaminophen <10.0 mcg/mL     Narrative:       Toxic = Greater than 150 mcg/mL        Imaging Results (last 72 hours)     Procedure Component Value Units Date/Time    CT Abdomen Pelvis Without Contrast [[id number]] Collected:  10/05/17 0657     Updated:  10/05/17 0704    Narrative:       PROCEDURE: CT ABDOMEN PELVIS WO CONTRAST-     HISTORY: abdominal pain     COMPARISON: October 16, 2016.     PROCEDURE: Axial images were obtained from the lung bases through the  pubic symphysis without intravenous contrast.    .      FINDINGS:      ABDOMEN: Lack of intravenous contrast limits evaluation of the solid  organs,  the mediastinum, and the vasculature. There is no  nephrolithiasis. There is no hydronephrosis. The limited noncontrast  images of the liver are normal. The gallbladder is unremarkable. . The  spleen is normal. No adrenal masses are seen.  The pancreas has an  unremarkable unenhanced appearance.. The aorta is normal in caliber.  There is no significant free fluid or adenopathy.  Limited noncontrast  images of the bowel are unremarkable. Nodular density within the lateral  lingula measuring up to 8 mm, this may be secondary to atelectasis.  Follow-up chest CT in 3 months is recommended to prior stability..      PELVIS: The appendix is normal. The urinary bladder is unremarkable.  There is no significant fluid or adenopathy. The osseous structures are  intact. . The uterus is surgically absent.       Impression:       1. No hydronephrosis or nephrolithiasis.  2. 8 mm nodular density in the lateral lingula, while this may be  secondary to atelectasis, follow-up CT in 3 months is recommended.                939.9 mGy.cm.  20.37 mGy     This study was performed with techniques to keep radiation doses as low  as reasonably achievable (ALARA). Individualized dose reduction  techniques using automated exposure control or adjustment of mA and/or  kV according to the patient size were employed.      This report was finalized on 10/5/2017 7:02 AM by Kayden Henderson DO.     Gallbladder [[id number]] Updated:  10/05/17 0750              budesonide-formoterol 2 puff Inhalation Daily   enoxaparin 40 mg Subcutaneous Daily   [START ON 10/6/2017] levoFLOXacin 250 mg Intravenous Q24H   levoFLOXacin 500 mg Intravenous Once   metroNIDAZOLE 500 mg Intravenous Q8H   IV Fluids 1000 mL + additives 100 mL/hr Intravenous Daily   ondansetron 4 mg Intravenous Once   pantoprazole 40 mg Oral Daily       sodium chloride 150 mL/hr Last Rate: 150 mL/hr (10/05/17 0846)       Assessment/Plan     1.   MARSHA (acute kidney injury)  2.   Non-intractable  vomiting with nausea  3.   HTN (hypertension)  4.   COPD (chronic obstructive pulmonary disease)  5.   Anxiety and depression  6.   Alcoholism  7.   Pancreatitis    MARSHA likely multifactorial with a component of pre-renal azotemia and ATN with concurrent use of NSAID.  Will avoid nephrotoxins and follow lab/urine studies closely.  Continue aggressive hydration and will adjust IVF as needed.  She has been started on oral antibiotics and flagyl with some improvement of symptoms.  Further workup as ordered. Clinically starting to improve.    Details were discussed with the patient as well as family in the room.  Details were also discussed with the hospitalist service.   Further recommendations will depend on clinical course of the patient during the current hospitalization.    I also discussed the details with the nursing staff.    Rest as ordered.    In closing, I sincerely appreciate opportunity to participate in care of this patient. If I can be of any further assistance with the management of this patient, please don’t hesitate to contact me.    Casper Flower MD  10/05/17  9:11 AM    EMR Dragon/Transcription disclaimer:   Much of this encounter note is an electronic transcription/translation of spoken language to printed text. The electronic translation of spoken language may permit erroneous, or at times, nonsensical words or phrases to be inadvertently transcribed; Although I have reviewed the note for such errors, some may still exist.

## 2017-10-05 NOTE — ED NOTES
Intermittent cath urine collection was <1 ml. Sent to lab, provider notified, orders received to start 3rd bag of NS.      Nishi Mueller RN  10/05/17 0229

## 2017-10-05 NOTE — PLAN OF CARE
Problem: Patient Care Overview (Adult)  Goal: Plan of Care Review  Outcome: Ongoing (interventions implemented as appropriate)    10/05/17 1829   Coping/Psychosocial Response Interventions   Plan Of Care Reviewed With patient   Patient Care Overview   Progress improving   Outcome Evaluation   Outcome Summary/Follow up Plan Pt. is feeling stronger and maintaining a reasonable BP att. Possible dc home tomorrow if labs are better.         Problem: Fall Risk (Adult)  Goal: Absence of Falls  Outcome: Ongoing (interventions implemented as appropriate)    Problem: Acute Alcohol Withdrawal Syndrome, Risk For/Actual (Adult)  Goal: Signs and Symptoms of Listed Potential Problems Will be Absent or Manageable (Acute Alcohol Withdrawal Syndrome, Risk For/Actual)  Outcome: Ongoing (interventions implemented as appropriate)

## 2017-10-05 NOTE — ED PROVIDER NOTES
"TRIAGE CHIEF COMPLAINT:     Nursing and triage notes reviewed    Chief Complaint   Patient presents with   • Abdominal Pain      HPI: Agatha Avendano is a 42 y.o. female who presents to the emergency department complaining of Abdominal pain, nausea, and vomiting.  Patient states that for the past several days she's been having mid bilateral flank pain as well as epigastric discomfort.  Started having some nausea and several episodes of nonbilious emesis today.  Patient describes her pain as aching and sharp.  No radiation of the pain.  Patient states she has not had as much urine output over the past week.  She states she does have a history of pancreatitis.  She denies having any fevers at home.     REVIEW OF SYSTEMS: All other systems reviewed and are negative     PAST MEDICAL HISTORY:   Past Medical History:   Diagnosis Date   • Anemia    • Anxiety    • Asthma    • Blood clot in vein    • COPD (chronic obstructive pulmonary disease)    • History of blood transfusion    • Hypertension    • Kidney disease    • Reflux esophagitis         FAMILY HISTORY:   Family History   Problem Relation Age of Onset   • Hypertension Mother    • Diabetes Father         SOCIAL HISTORY:   Social History     Social History   • Marital status: Legally      Spouse name: N/A   • Number of children: N/A   • Years of education: N/A     Occupational History   • Not on file.     Social History Main Topics   • Smoking status: Current Every Day Smoker     Packs/day: 1.00     Types: Cigarettes   • Smokeless tobacco: Never Used   • Alcohol use Yes      Comment: \"1/2 pint of alcohol 1-2 x wk\"   • Drug use: No   • Sexual activity: Not on file     Other Topics Concern   • Not on file     Social History Narrative   • No narrative on file        SURGICAL HISTORY:   Past Surgical History:   Procedure Laterality Date   •  SECTION     • HYSTERECTOMY          CURRENT MEDICATIONS:      Medication List      ASK your doctor about these " medications          albuterol 108 (90 Base) MCG/ACT inhaler   Commonly known as:  PROVENTIL HFA;VENTOLIN HFA       azithromycin 250 MG tablet   Commonly known as:  ZITHROMAX   Take 1 tablet by mouth Daily. Take 2 tablets the first day, then 1 tablet   daily for 4 days.       budesonide-formoterol 160-4.5 MCG/ACT inhaler   Commonly known as:  SYMBICORT       cyclobenzaprine 10 MG tablet   Commonly known as:  FLEXERIL   Take 1 tablet by mouth 3 (Three) Times a Day.       EXCEDRIN MIGRAINE PO       lisinopril 10 MG tablet   Commonly known as:  PRINIVIL,ZESTRIL       meloxicam 15 MG tablet   Commonly known as:  MOBIC   Take 1 tablet by mouth Daily.       MethylPREDNISolone 4 MG tablet   Commonly known as:  MEDROL (SREEKANTH)   Take as directed on package instructions.       naproxen 375 MG tablet   Commonly known as:  NAPROSYN   Take 1 tablet by mouth 2 (Two) Times a Day As Needed for mild pain (1-3).       pantoprazole 20 MG EC tablet   Commonly known as:  PROTONIX       predniSONE 20 MG tablet   Commonly known as:  DELTASONE   Take 3 tablets by mouth Daily.       QUEtiapine 100 MG tablet   Commonly known as:  SEROquel            ALLERGIES: Review of patient's allergies indicates no known allergies.     PHYSICAL EXAM:   VITAL SIGNS:   Vitals:    10/05/17 0015   BP: 96/57   Pulse: (!) 126   Resp: 20   Temp: 98 °F (36.7 °C)   SpO2: 99%      CONSTITUTIONAL: Awake, oriented, appears non-toxic   HENT: Atraumatic, normocephalic, oral mucosa pink and moist, airway patent. Nares patent without drainage. External ears normal.   EYES: Conjunctiva clear, EOMI, PERRL   NECK: Trachea midline, non-tender, supple   CARDIOVASCULAR: Tachycardic with a regular rhythm, No murmurs, rubs, gallops   PULMONARY/CHEST: Clear to auscultation, no rhonchi, wheezes, or rales. Symmetrical breath sounds.  ABDOMINAL: Non-distended, soft, I'll diffuse abdominal tenderness that is worse in the epigastric area - no rebound or guarding. BS normal.    NEUROLOGIC: Non-focal, moving all four extremities, no gross sensory or motor deficits.   EXTREMITIES: No clubbing, cyanosis, or edema   SKIN: Warm, Dry, No erythema, No rash     ED COURSE / MEDICAL DECISION MAKING:   Agatha Avendano is a 42 y.o. female who presents to the emergency department for evaluation of nausea, vomiting, back pain, and abdominal discomfort.  Patient is tachycardic on arrival but other vital signs are stable.  Exam does reveal some epigastric tenderness to palpation as well as mild bilateral flank discomfort.  Labs and imaging obtained for further evaluation.  IV started and patient started on IV fluids given borderline hypertension.  Her blood pressure did decrease shortly after arrival.  Her blood pressure improved after treatment with IV fluids.  Lactic acid within normal limits.  No sign of urinary infection.  CT scan the abdomen and pelvis was largely unremarkable.  Patient did have slight elevation in her creatinine from 1.1-2 indicating some degree of dehydration.  Patient's lipase elevated at 400.  She also had a elevated white blood cell count 19.  No obvious source of infection at this time.  Patient improved with fluids, nausea medicine.  Given her acute renal injury I think she will need to be admitted to the hospital.  Blood pressure remained stable on IV fluid infusion.    DECISION TO DISCHARGE/ADMIT: see ED care timeline     FINAL IMPRESSION:   1 -- pancreatitis   2 -- acute renal injury  3 -- nausea and vomiting    Electronically signed by: Lia Schrader MD, 10/5/2017 12:46 AM       Lia Schrader MD  10/05/17 0537

## 2017-10-05 NOTE — PROGRESS NOTES
Discharge Planning Assessment   [facility]     Patient Name: Agatha Avendano  MRN: [id number]  Today's Date: 10/5/2017    Admit Date: 10/5/2017          Discharge Needs Assessment       10/05/17 1254    Living Environment    Lives With significant other    Living Arrangements mobile home    Home Accessibility stairs to enter home    Number of Stairs to Enter Home 2    Stair Railings at Home none    Type of Financial/Environmental Concern none    Transportation Available car    Living Environment    Provides Primary Care For no one    Quality Of Family Relationships unable to assess    Able to Return to Prior Living Arrangements yes    Discharge Needs Assessment    Concerns To Be Addressed no discharge needs identified;denies needs/concerns at this time    Readmission Within The Last 30 Days no previous admission in last 30 days    Equipment Currently Used at Home oxygen   2 liters at night provided by [facility]            Discharge Plan       10/05/17 1258    Case Management/Social Work Plan    Plan Discharge Planning    Additional Comments SW met with pt at bedside for initial discharge planning. Pt states that she is independent with ADL's. She lives in a mobile home with her significant other who is also listed as her emergency contact. There is one step to enter her home. She uses oxygen at home at night and is on 2 liters provided by [employer]. Her new provider is LAINA Finley. She denies POA / living will and declines info. She does not anticipate any needs at discharge. CM will continue to follow and assist.        Discharge Placement     No information found                Demographic Summary     None            Functional Status       10/05/17 1252    Functional Status Prior    Ambulation 0-->independent    Transferring 0-->independent    Toileting 0-->independent    Bathing 0-->independent    Dressing 0-->independent    Eating 0-->independent    Communication 0-->understands/communicates without  difficulty    Swallowing 0-->swallows foods/liquids without difficulty    IADL    Medications independent    Meal Preparation independent    Housekeeping independent    Laundry independent    Shopping independent    Oral Care independent    Activity Tolerance    Usual Activity Tolerance good    Cognitive/Perceptual/Developmental    Current Mental Status/Cognitive Functioning no deficits noted    Recent Changes in Mental Status/Cognitive Functioning no changes    Developmental Stage (Eriksson's Stages of Development) Stage 7 (35-65 years/Middle Adulthood) Generativity vs. Stagnation            Psychosocial     None            Abuse/Neglect     None            Legal     None            Substance Abuse     None            Patient Forms     None          PARIS Sierra  10/05/17  1:04 PM

## 2017-10-05 NOTE — ED NOTES
0533: DR. JAY CALLED AGAIN, CALL SENT TO DR. REYNOSO @ THIS TIME.     Kalina Ramachandran  10/05/17 0534

## 2017-10-05 NOTE — PAYOR COMM NOTE
"Provider ID [id number]  Dr Aldana NPI [id number]  ICD 10 codes R11.2,N17.9, J44.9, F10.20  Please review for inpatient auth      Juan Alberto Falk (42 y.o. Female)     Date of Birth Social Security Number Address Home Phone MRN    1975  [id number]  [facility] [address] [phone] [id number]    [employer] Marital Status          None Legally        Admission Date Admission Type Admitting Provider Attending Provider Department, Room/Bed    10/5/17 Emergency Steven Aldana DO Gaspar, Daniel F., MD [facility] MED SURG  3, 326/1    Discharge Date Discharge Disposition Discharge Destination                      Attending Provider: Darrius Arvizu MD     Allergies:  No Known Allergies    Isolation:  None   Infection:  None   Code Status:  FULL    Ht:  66\" (167.6 cm)   Wt:  180 lb (81.6 kg)    Admission Cmt:  None   Principal Problem:  None                Active Insurance as of 10/5/2017     Primary Coverage     Payor Plan Insurance Group Employer/Plan Group    [employer] [place] [employer] [place]ICA[place]      Payor Plan Address Payor Plan Phone Number Effective From Effective To    [address] [address] [phone] [address]     [place] [address]       Subscriber Name Subscriber Birth Date Member ID       JUAN ALBERTO FALK 1975 [id number]                 Emergency Contacts      (Rel.) Home Phone Work Phone Mobile Phone    Vikas Garces (Other) [phone] -- --               History & Physical      Steven Aldana DO at 10/5/2017  6:22 AM              [facility] HOSPITALIST   HISTORY AND PHYSICAL      Name:  Juan Alberto Falk   Age:  42 y.o.  Sex:  female  :  1975  MRN:  [id number]   Visit Number:  [id number]  Admission Date:  10/5/2017  Date Of Service:  10/05/17  Primary Care Physician:  FREDRICK Patel    History Obtained From:    patient    Chief Complaint:     Nausea and vomiting     History Of Presenting Illness:      42-year-old "  female with history of hypertension, COPD, nephrolithiasis and pancreatitis as well as alcoholism comes in with nausea times several days.  She then developed nausea and vomiting as well as abdominal pain yesterday.  Her abdominal pain is in the epigastric area, 10 out of 10 in intensity and radiates into the bilateral flanks.  She has had some chills with this.  She denies any hematemesis but she vomited several times yesterday.  She denies any diarrhea.  She does have some shortness of breath.  She denies any chest pressure.  She last had pancreatitis in August.  She claims she is only drinking every 2 weeks a half a pint of liquor although on her last visit she had admitted to daily use of liquor.  Her WBC count was 19.35 today.  Her creatinine is 2.00 with a baseline of 1.  CT abdomen was unremarkable.  She rates the nausea and the abdominal pain as severe.  Food seems to make it worse.  She also was hypotensive in the emergency room, requiring several boluses of IV fluid to keep her blood pressure above 90.  She does take Excedrin at home, advised her not to take this as well as not to drink alcohol.  She denies any dysuria or hematuria.  Alcohol level in the emergency room was negative.  Her lipase was 404.  Patient denies any anxiety or depression or suicidal ideation.    Review Of Systems:     General ROS: positive for  - chills  Psychological ROS: negative  Ophthalmic ROS: negative  ENT ROS: negative  Allergy and Immunology ROS: negative  Hematological and Lymphatic ROS: negative  Endocrine ROS: negative  Breast ROS: negative  Respiratory ROS: positive for - shortness of breath  Cardiovascular ROS: negative  Gastrointestinal ROS: positive for - nausea/vomiting  Genito-Urinary ROS: negative  Musculoskeletal ROS: negative  Neurological ROS: negative  Dermatological ROS: negative       Past Medical History:    Hypertension, COPD, nephrolithiasis, pancreatitis, alcoholism    Past Surgical  history:    , total abdominal hysterectomy, finger surgery      Social History:    1 pack per day ×30 years of smoking, half pint liquor every 2 weeks she claims although last visit she was using on a daily basis, denies any drugs.  Has 3 living children with one child  from congenital heart disease.    Family History:    Father has diabetes, mother has diabetes, son had Down syndrome as well as congenital heart disease and congestive heart failure.    Allergies:      Review of patient's allergies indicates no known allergies.    Home Medications:    Prior to Admission Medications     Prescriptions Last Dose Informant Patient Reported? Taking?    albuterol (PROVENTIL HFA;VENTOLIN HFA) 108 (90 BASE) MCG/ACT inhaler   Yes No    Inhale 2 puffs Daily.    Aspirin-Acetaminophen-Caffeine (EXCEDRIN MIGRAINE PO)   Yes No    Take  by mouth.    azithromycin (ZITHROMAX) 250 MG tablet   No No    Take 1 tablet by mouth Daily. Take 2 tablets the first day, then 1 tablet daily for 4 days.    budesonide-formoterol (SYMBICORT) 160-4.5 MCG/ACT inhaler   Yes No    Inhale 2 puffs Daily.    cyclobenzaprine (FLEXERIL) 10 MG tablet   No No    Take 1 tablet by mouth 3 (Three) Times a Day.    lisinopril (PRINIVIL,ZESTRIL) 10 MG tablet   Yes No    Take 10 mg by mouth Daily.    meloxicam (MOBIC) 15 MG tablet   No No    Take 1 tablet by mouth Daily.    MethylPREDNISolone (MEDROL, SREEKANTH,) 4 MG tablet   No No    Take as directed on package instructions.    naproxen (NAPROSYN) 375 MG tablet   No No    Take 1 tablet by mouth 2 (Two) Times a Day As Needed for mild pain (1-3).    pantoprazole (PROTONIX) 20 MG EC tablet   Yes No    Take 20 mg by mouth Daily.    predniSONE (DELTASONE) 20 MG tablet   No No    Take 3 tablets by mouth Daily.    QUEtiapine (SEROquel) 100 MG tablet   Yes No    Take 100 mg by mouth Every Night.             Hospital Scheduled Meds:      ondansetron 4 mg Intravenous Once         sodium chloride 125 mL/hr Last  Rate: 999 mL/hr (10/05/17 0520)        Vital Signs:    Temp:  [97.9 °F (36.6 °C)-98 °F (36.7 °C)] 97.9 °F (36.6 °C)  Heart Rate:  [] 108  Resp:  [18-20] 18  BP: ()/(42-69) 93/46    Last 3 weights    10/05/17  0015   Weight: 182 lb (82.6 kg)       Body mass index is 29.38 kg/(m^2).    Physical Exam:      General Appearance:    Alert, cooperative, in no acute distress   Head:    Normocephalic, without obvious abnormality, atraumatic   Eyes:            Lids and lashes normal, conjunctivae and sclerae normal, no   icterus, no pallor, corneas clear, PERRLA   Ears:    Ears appear intact with no abnormalities noted   Throat:   No oral lesions, no thrush, oral mucosa moist   Neck:   No adenopathy, supple, trachea midline, no thyromegaly, no     carotid bruit, no JVD   Back:     No kyphosis present, no scoliosis present, no skin lesions,       erythema or scars, no tenderness to percussion or                   palpation,   range of motion normal   Lungs:     Clear to auscultation,respirations regular, even and                   unlabored    Heart:    Regular rhythm and normal rate, normal S1 and S2, no            murmur, no gallop, no rub, no click   Breast Exam:    Deferred   Abdomen:     Normal bowel sounds, no masses, no organomegaly, soft        non-tender, non-distended, no guarding, no rebound                 tenderness   Genitalia:    Deferred   Extremities:   Moves all extremities well, no edema, no cyanosis, no              redness   Pulses:   Pulses palpable and equal bilaterally   Skin:   No bleeding, bruising or rash   Lymph nodes:   No palpable adenopathy   Neurologic:   Cranial nerves 2 - 12 grossly intact, sensation intact, DTR        present and equal bilaterally         EKG:      Sinus tachycardia with no acute ST-T changes.    Telemetry:      Sinus tachycardia    I have personally looked at both the EKG and the telemetry strips.    Labs:    Results from last 7 days  Lab Units 10/05/17  3568  10/05/17  0028   LACTATE mmol/L 1.0  --    WBC 10*3/mm3  --  19.35*   HEMOGLOBIN g/dL  --  15.1   HEMATOCRIT %  --  44.3   MCV fL  --  94.5   MCHC g/dL  --  34.1   PLATELETS 10*3/mm3  --  261           Results from last 7 days  Lab Units 10/05/17  0028   SODIUM mmol/L 135*   POTASSIUM mmol/L 5.2*   CHLORIDE mmol/L 99   CO2 mmol/L 6.0*   BUN mg/dL 24*   CREATININE mg/dL 2.00*   EGFR IF NONAFRICN AM mL/min/1.73 27*   CALCIUM mg/dL 11.0*   GLUCOSE mg/dL 168*   ALBUMIN g/dL 5.60*   BILIRUBIN mg/dL 0.9   ALK PHOS U/L 90   AST (SGOT) U/L 34   ALT (SGPT) U/L 31   Estimated Creatinine Clearance: 39.3 mL/min (by C-G formula based on Cr of 2).  No results found for: AMMONIA    Results from last 7 days  Lab Units 10/05/17  0028   TROPONIN I ng/mL <0.012         No results found for: HGBA1C  Lab Results   Component Value Date    TSH 4.04 08/10/2016     No results found for: PREGTESTUR, PREGSERUM, HCG, HCGQUANT  Pain Management Panel     There is no flowsheet data to display.                          Radiology:    Imaging Results (last 7 days)     Procedure Component Value Units Date/Time    CT Abdomen Pelvis Without Contrast [[id number]] Updated:  10/05/17 0236          Assessment:    1.  Acute renal failure  2.  Intractable nausea and vomiting  3.  High anion gap Metabolic acidosis suspect from dehydration/Renal failure  4.  Mild pancreatitis with elevated lipase   5.  Sinus tachycardia  6.  COPD  7.  Hypertension  8.  Current hypotension due to hypovolemia.  9.  Alcoholism  10.  Abdominal pain  11.  Elevated WBC, doubt infection at this point.    Plan:     We'll give IV fluids at 150 cc per hour.  Serial labs.  Place on alcohol withdrawal protocol.  We'll replace multivitamins, folate, thiamine.  Check ultrasound of the gallbladder.  Hold her Zestril at the present time.  Would avoid NSAIDs.  Encouraged patient to avoid alcohol.  Check Tylenol as well as aspirin level.  We'll keep on clear liquids.  Patient already seems to be  "improving from the IV fluids.  Lovenox for DVT prophylaxis.  Anticipated stay is less than 2 midnights.    Steven Aldana DO  10/05/17  6:23 AM     Electronically signed by Steven Aldana DO at 10/5/2017  6:35 AM           Emergency Department Notes      Lia Schrader MD at 10/5/2017 12:46 AM          TRIAGE CHIEF COMPLAINT:     Nursing and triage notes reviewed    Chief Complaint   Patient presents with   • Abdominal Pain      HPI: Agatha Avendano is a 42 y.o. female who presents to the emergency department complaining of Abdominal pain, nausea, and vomiting.  Patient states that for the past several days she's been having mid bilateral flank pain as well as epigastric discomfort.  Started having some nausea and several episodes of nonbilious emesis today.  Patient describes her pain as aching and sharp.  No radiation of the pain.  Patient states she has not had as much urine output over the past week.  She states she does have a history of pancreatitis.  She denies having any fevers at home.     REVIEW OF SYSTEMS: All other systems reviewed and are negative     PAST MEDICAL HISTORY:   Past Medical History:   Diagnosis Date   • Anemia    • Anxiety    • Asthma    • Blood clot in vein    • COPD (chronic obstructive pulmonary disease)    • History of blood transfusion    • Hypertension    • Kidney disease    • Reflux esophagitis         FAMILY HISTORY:   Family History   Problem Relation Age of Onset   • Hypertension Mother    • Diabetes Father         SOCIAL HISTORY:   Social History     Social History   • Marital status: Legally      Spouse name: N/A   • Number of children: N/A   • Years of education: N/A     Occupational History   • Not on file.     Social History Main Topics   • Smoking status: Current Every Day Smoker     Packs/day: 1.00     Types: Cigarettes   • Smokeless tobacco: Never Used   • Alcohol use Yes      Comment: \"1/2 pint of alcohol 1-2 x wk\"   • Drug use: No   • " Sexual activity: Not on file     Other Topics Concern   • Not on file     Social History Narrative   • No narrative on file        SURGICAL HISTORY:   Past Surgical History:   Procedure Laterality Date   •  SECTION     • HYSTERECTOMY          CURRENT MEDICATIONS:      Medication List      ASK your doctor about these medications          albuterol 108 (90 Base) MCG/ACT inhaler   Commonly known as:  PROVENTIL HFA;VENTOLIN HFA       azithromycin 250 MG tablet   Commonly known as:  ZITHROMAX   Take 1 tablet by mouth Daily. Take 2 tablets the first day, then 1 tablet   daily for 4 days.       budesonide-formoterol 160-4.5 MCG/ACT inhaler   Commonly known as:  SYMBICORT       cyclobenzaprine 10 MG tablet   Commonly known as:  FLEXERIL   Take 1 tablet by mouth 3 (Three) Times a Day.       EXCEDRIN MIGRAINE PO       lisinopril 10 MG tablet   Commonly known as:  PRINIVIL,ZESTRIL       meloxicam 15 MG tablet   Commonly known as:  MOBIC   Take 1 tablet by mouth Daily.       MethylPREDNISolone 4 MG tablet   Commonly known as:  MEDROL (SREEKANTH)   Take as directed on package instructions.       naproxen 375 MG tablet   Commonly known as:  NAPROSYN   Take 1 tablet by mouth 2 (Two) Times a Day As Needed for mild pain (1-3).       pantoprazole 20 MG EC tablet   Commonly known as:  PROTONIX       predniSONE 20 MG tablet   Commonly known as:  DELTASONE   Take 3 tablets by mouth Daily.       QUEtiapine 100 MG tablet   Commonly known as:  SEROquel            ALLERGIES: Review of patient's allergies indicates no known allergies.     PHYSICAL EXAM:   VITAL SIGNS:   Vitals:    10/05/17 0015   BP: 96/57   Pulse: (!) 126   Resp: 20   Temp: 98 °F (36.7 °C)   SpO2: 99%      CONSTITUTIONAL: Awake, oriented, appears non-toxic   HENT: Atraumatic, normocephalic, oral mucosa pink and moist, airway patent. Nares patent without drainage. External ears normal.   EYES: Conjunctiva clear, EOMI, PERRL   NECK: Trachea midline, non-tender, supple    CARDIOVASCULAR: Tachycardic with a regular rhythm, No murmurs, rubs, gallops   PULMONARY/CHEST: Clear to auscultation, no rhonchi, wheezes, or rales. Symmetrical breath sounds.  ABDOMINAL: Non-distended, soft, I'll diffuse abdominal tenderness that is worse in the epigastric area - no rebound or guarding. BS normal.   NEUROLOGIC: Non-focal, moving all four extremities, no gross sensory or motor deficits.   EXTREMITIES: No clubbing, cyanosis, or edema   SKIN: Warm, Dry, No erythema, No rash     ED COURSE / MEDICAL DECISION MAKING:   Agatha Avendano is a 42 y.o. female who presents to the emergency department for evaluation of nausea, vomiting, back pain, and abdominal discomfort.  Patient is tachycardic on arrival but other vital signs are stable.  Exam does reveal some epigastric tenderness to palpation as well as mild bilateral flank discomfort.  Labs and imaging obtained for further evaluation.  IV started and patient started on IV fluids given borderline hypertension.  Her blood pressure did decrease shortly after arrival.  Her blood pressure improved after treatment with IV fluids.  Lactic acid within normal limits.  No sign of urinary infection.  CT scan the abdomen and pelvis was largely unremarkable.  Patient did have slight elevation in her creatinine from 1.1-2 indicating some degree of dehydration.  Patient's lipase elevated at 400.  She also had a elevated white blood cell count 19.  No obvious source of infection at this time.  Patient improved with fluids, nausea medicine.  Given her acute renal injury I think she will need to be admitted to the hospital.  Blood pressure remained stable on IV fluid infusion.    DECISION TO DISCHARGE/ADMIT: see ED care timeline     FINAL IMPRESSION:   1 -- pancreatitis   2 -- acute renal injury  3 -- nausea and vomiting    Electronically signed by: Lia Schrader MD, 10/5/2017 12:46 AM       Lia Schrader MD  10/05/17 0537       Electronically signed by  Lia Reynoso MD at 10/5/2017  5:37 AM      Nishi Mueller RN at 10/5/2017  2:12 AM          Intermittent cath urine collection was <1 ml. Sent to lab, provider notified, orders received to start 3rd bag of NS.      Nishi Mueller RN  10/05/17 0221       Electronically signed by Nishi Mueller RN at 10/5/2017  2:21 AM      Kalina Ramachandran at 10/5/2017  4:58 AM          0459: DR. JAY CALLED PER DR. REYNOSO, VOICEMAIL LEFT TO CALL ED BACK.     Kalina Ramachandran  10/05/17 0500       Electronically signed by Kalina Ramachandran at 10/5/2017  5:00 AM      Kalina Ramachandran at 10/5/2017  5:33 AM          0533: DR. JAY CALLED AGAIN, CALL SENT TO DR. REYNOSO @ THIS TIME.     Kalina Ramachandran  10/05/17 0534       Electronically signed by Kalina Ramachandran at 10/5/2017  5:34 AM        Vital Signs (last 24 hours)       10/04 0700  -  10/05 0659 10/05 0700  -  10/05 1045   Most Recent    Temp (°F) 97.9 -  98.3       98.3 (36.8)    Heart Rate 96 -  (!)135       108    Resp 18 -  20       18    BP (!)64/42 -  117/59       93/46    SpO2 (%) 96 -  100       98          Hospital Medications (active)       Dose Frequency Start End    acetaminophen (TYLENOL) tablet 650 mg 650 mg Every 4 Hours PRN 10/5/2017     Sig - Route: Take 2 tablets by mouth Every 4 (Four) Hours As Needed for Mild Pain . - Oral    albuterol (PROVENTIL) nebulizer solution 0.083% 2.5 mg/3mL 2.5 mg Every 4 Hours PRN 10/5/2017     Sig - Route: Take 2.5 mg by nebulization Every 4 (Four) Hours As Needed for Wheezing. - Nebulization    budesonide-formoterol (SYMBICORT) 160-4.5 MCG/ACT inhaler 2 puff 2 puff Daily 10/5/2017     Sig - Route: Inhale 2 puffs Daily. - Inhalation    enoxaparin (LOVENOX) syringe 40 mg 40 mg Daily 10/5/2017     Sig - Route: Inject 0.4 mL under the skin Daily. - Subcutaneous    levoFLOXacin (LEVAQUIN) 250 mg/50 mL D5W (premix) 250 mg 250 mg Every 24 Hours 10/6/2017     Sig - Route: Infuse 50 mL into a venous catheter Daily. -  "Intravenous    levoFLOXacin (LEVAQUIN) 500 mg/100 mL D5W (premix) (LEVAQUIN) 500 mg 500 mg Once 10/5/2017 10/5/2017    Sig - Route: Infuse 100 mL into a venous catheter 1 (One) Time. - Intravenous    LORazepam (ATIVAN) injection 1 mg 1 mg Every 2 Hours PRN 10/5/2017 10/15/2017    Sig - Route: Infuse 0.5 mL into a venous catheter Every 2 (Two) Hours As Needed for Withdrawal (For CIWA score 8-10). - Intravenous    Linked Group 1:  \"Or\" Linked Group Details        LORazepam (ATIVAN) injection 2 mg 2 mg Every 1 Hour PRN 10/5/2017 10/15/2017    Sig - Route: Infuse 1 mL into a venous catheter Every 1 (One) Hour As Needed for Withdrawal (For CIWA score 11-15). - Intravenous    Linked Group 1:  \"Or\" Linked Group Details        LORazepam (ATIVAN) injection 2 mg 2 mg Every 15 Minutes PRN 10/5/2017 10/15/2017    Sig - Route: Infuse 1 mL into a venous catheter Every 15 (Fifteen) Minutes As Needed for Anxiety (Use IV route first.  If unable to give IV, use IM.  For CIWA-Ar greater than 15.  Repeat dose in 15 minutes if CIWA-Ar is not decreasing.). - Intravenous    Linked Group 1:  \"Or\" Linked Group Details        LORazepam (ATIVAN) injection 2 mg 2 mg Every 15 Minutes PRN 10/5/2017 10/15/2017    Sig - Route: Inject 1 mL into the shoulder, thigh, or buttocks Every 15 (Fifteen) Minutes As Needed for Withdrawal (Use IV route first.  If unable to give IV, use IM.  For CIWA-Ar greater than 15.  Repeat dose in 15 minutes if CIWA-Ar is not decreasing.). - Intramuscular    Linked Group 1:  \"Or\" Linked Group Details        LORazepam (ATIVAN) tablet 1 mg 1 mg Every 2 Hours PRN 10/5/2017 10/15/2017    Sig - Route: Take 2 tablets by mouth Every 2 (Two) Hours As Needed for Withdrawal (For CIWA score 8-10). - Oral    Linked Group 1:  \"Or\" Linked Group Details        LORazepam (ATIVAN) tablet 2 mg 2 mg Every 1 Hour PRN 10/5/2017 10/15/2017    Sig - Route: Take 4 tablets by mouth Every 1 (One) Hour As Needed for Withdrawal (For CIWA score " "11-15). - Oral    Linked Group 1:  \"Or\" Linked Group Details        metroNIDAZOLE (FLAGYL) IVPB 500 mg 500 mg Every 8 Hours 10/5/2017     Sig - Route: Infuse 100 mL into a venous catheter Every 8 (Eight) Hours. - Intravenous    morphine injection 2 mg 2 mg Every 4 Hours PRN 10/5/2017 10/15/2017    Sig - Route: Infuse 1 mL into a venous catheter Every 4 (Four) Hours As Needed for Moderate Pain . - Intravenous    Linked Group 2:  \"And\" Linked Group Details        Morphine injection 4 mg 4 mg Once 10/5/2017 10/5/2017    Sig - Route: Infuse 1 mL into a venous catheter 1 (One) Time. - Intravenous    multiple vitamin (M.V.I. Adult) 10 mL, thiamine (B-1) 100 mg, folic acid 1 mg, magnesium sulfate 1 g in sodium chloride 0.9 % 1,000 mL infusion 100 mL/hr Daily 10/5/2017 10/8/2017    Sig - Route: Infuse 100 mL/hr into a venous catheter Daily. - Intravenous    naloxone (NARCAN) injection 0.4 mg 0.4 mg Every 5 Minutes PRN 10/5/2017     Sig - Route: Infuse 1 mL into a venous catheter Every 5 (Five) Minutes As Needed for Respiratory Depression. - Intravenous    Linked Group 2:  \"And\" Linked Group Details        ondansetron (ZOFRAN) injection 4 mg 4 mg Once 10/5/2017 10/5/2017    Sig - Route: Infuse 2 mL into a venous catheter 1 (One) Time. - Intravenous    ondansetron (ZOFRAN) injection 4 mg 4 mg Once 10/5/2017     Sig - Route: Infuse 2 mL into a venous catheter 1 (One) Time. - Intravenous    ondansetron (ZOFRAN) injection 4 mg 4 mg Every 6 Hours PRN 10/5/2017     Sig - Route: Infuse 2 mL into a venous catheter Every 6 (Six) Hours As Needed for Nausea or Vomiting. - Intravenous    pantoprazole (PROTONIX) EC tablet 40 mg 40 mg Daily 10/5/2017     Sig - Route: Take 1 tablet by mouth Daily. - Oral    sodium chloride 0.9 % bolus 1,000 mL 1,000 mL Once 10/5/2017 10/5/2017    Sig - Route: Infuse 1,000 mL into a venous catheter 1 (One) Time. - Intravenous    Cosign for Ordering: Required by Lia Schrader MD    sodium chloride " 0.9 % bolus 2,000 mL 2,000 mL Once 10/5/2017 10/5/2017    Sig - Route: Infuse 2,000 mL into a venous catheter 1 (One) Time. - Intravenous    sodium chloride 0.9 % flush 1-10 mL 1-10 mL As Needed 10/5/2017     Sig - Route: Infuse 1-10 mL into a venous catheter As Needed for Line Care. - Intravenous    sodium chloride 0.9 % infusion 150 mL/hr Continuous 10/5/2017     Sig - Route: Infuse 150 mL/hr into a venous catheter Continuous. - Intravenous    albuterol (PROVENTIL HFA;VENTOLIN HFA) inhaler 2 puff (Discontinued) 2 puff Every 4 Hours PRN 10/5/2017 10/5/2017    Sig - Route: Inhale 2 puffs Every 4 (Four) Hours As Needed for Wheezing. - Inhalation    Reason for Discontinue: Formulary change    levoFLOXacin (LEVAQUIN) 500 mg/100 mL D5W (premix) (LEVAQUIN) 500 mg (Discontinued) 500 mg Every 24 Hours 10/5/2017 10/5/2017    Sig - Route: Infuse 100 mL into a venous catheter Daily. - Intravenous    sodium chloride 0.9 % infusion (Discontinued) 125 mL/hr Continuous 10/5/2017 10/5/2017    Sig - Route: Infuse 125 mL/hr into a venous catheter Continuous. - Intravenous            Lab Results (last 24 hours)     Procedure Component Value Units Date/Time    CBC & Differential [[id number]] Collected:  10/05/17 0028    Specimen:  Blood Updated:  10/05/17 0043    Narrative:       The following orders were created for panel order CBC & Differential.  Procedure                               Abnormality         Status                     ---------                               -----------         ------                     CBC Auto Differential[[id number]]        Abnormal            Final result                 Please view results for these tests on the individual orders.    CBC Auto Differential [[id number]]  (Abnormal) Collected:  10/05/17 0028    Specimen:  Blood Updated:  10/05/17 0043     WBC 19.35 (H) 10*3/mm3      RBC 4.69 10*6/mm3      Hemoglobin 15.1 g/dL      Hematocrit 44.3 %      MCV 94.5 fL      MCH 32.2 (H) pg      MCHC 34.1  g/dL      RDW 13.2 %      RDW-SD 45.4 fl      MPV 11.4 fL      Platelets 261 10*3/mm3      Neutrophil % 77.7 %      Lymphocyte % 13.7 %      Monocyte % 5.6 %      Eosinophil % 0.5 %      Basophil % 0.5 %      Immature Grans % 2.0 (H) %      Neutrophils, Absolute 15.04 (H) 10*3/mm3      Lymphocytes, Absolute 2.66 10*3/mm3      Monocytes, Absolute 1.08 (H) 10*3/mm3      Eosinophils, Absolute 0.10 10*3/mm3      Basophils, Absolute 0.09 10*3/mm3      Immature Grans, Absolute 0.38 (H) 10*3/mm3      nRBC 0.0 /100 WBC     Ethanol [[id number]] Collected:  10/05/17 0028    Specimen:  Blood Updated:  10/05/17 0105     Ethanol <10 mg/dL      Ethanol % <0.010 %     Narrative:       This result is for medical use only and should not be used for forensic purposes.    Troponin [[id number]]  (Normal) Collected:  10/05/17 0028    Specimen:  Blood Updated:  10/05/17 0105     Troponin I <0.012 ng/mL     Narrative:       Normal Patient Upper Reference Limit (URL) (99th Percentile)=0.03 ng/mL   Non-AMI Illness Reference Limit=0.03-0.11 ng/mL   AMI Confirmation=0.12 ng/mL and above    Lipase [[id number]]  (Abnormal) Collected:  10/05/17 0028    Specimen:  Blood Updated:  10/05/17 0105     Lipase 404 (H) U/L     Comprehensive Metabolic Panel [[id number]]  (Abnormal) Collected:  10/05/17 0028    Specimen:  Blood Updated:  10/05/17 0107     Glucose 168 (H) mg/dL      BUN 24 (H) mg/dL      Creatinine 2.00 (H) mg/dL      Sodium 135 (L) mmol/L      Potassium 5.2 (H) mmol/L      Chloride 99 mmol/L      CO2 6.0 (C) mmol/L      Calcium 11.0 (H) mg/dL      Total Protein 9.6 (H) g/dL      Albumin 5.60 (H) g/dL      ALT (SGPT) 31 U/L      AST (SGOT) 34 U/L      Alkaline Phosphatase 90 U/L      Total Bilirubin 0.9 mg/dL      eGFR Non African Amer 27 (L) mL/min/1.73      Globulin 4.0 gm/dL      A/G Ratio 1.4 g/dL      BUN/Creatinine Ratio 12.0     Anion Gap 35.2 mmol/L     Narrative:       Abnormal estimated GFR should be followed by more specific studies  to confirm end stage chronic renal disease. The equation used for calculation may not be accurate for patients less than 19 years old, greater than 70 years old, patients at extremes of weight, malnutrition, or with acute renal dysfunction.    [facility] Draw [[id number]] Collected:  10/05/17 0028    Specimen:  Blood Updated:  10/05/17 0131    Narrative:       The following orders were created for panel order [facility] Draw.  Procedure                               Abnormality         Status                     ---------                               -----------         ------                     Light Blue Top[[id number]]                                   Final result               Lavender Top[[id number]]                                     Final result               Gold Top - SST[[id number]]                                   Final result               Green Top (No Gel)[[id number]]                               Final result                 Please view results for these tests on the individual orders.    Light Blue Top [[id number]] Collected:  10/05/17 0028    Specimen:  Blood Updated:  10/05/17 0131     Extra Tube hold for add-on      Auto resulted       Lavender Top [[id number]] Collected:  10/05/17 0028    Specimen:  Blood Updated:  10/05/17 0131     Extra Tube hold for add-on      Auto resulted       Gold Top - SST [[id number]] Collected:  10/05/17 0028    Specimen:  Blood Updated:  10/05/17 0131     Extra Tube Hold for add-ons.      Auto resulted.       Green Top (No Gel) [[id number]] Collected:  10/05/17 0028    Specimen:  Blood Updated:  10/05/17 0131     Extra Tube Hold for add-ons.      Auto resulted.       Lactic Acid, Plasma [[id number]]  (Normal) Collected:  10/05/17 0130    Specimen:  Blood Updated:  10/05/17 0155     Lactate 1.0 mmol/L     Urinalysis, Microscopic Only - Urine, Clean Catch [[id number]]  (Abnormal) Collected:  10/05/17 0206    Specimen:  Urine from Urine, Clean Catch Updated:  10/05/17 0236     RBC,  UA None Seen /HPF      WBC, UA None Seen /HPF      Bacteria, UA None Seen /HPF      Squamous Epithelial Cells, UA 31-50 (A) /HPF      Hyaline Casts, UA None Seen /LPF      Amorphous Crystals, UA Moderate/2+ /HPF      Methodology Manual Light Microscopy    Salicylate Level [[id number]]  (Abnormal) Collected:  10/05/17 0028    Specimen:  Blood Updated:  10/05/17 0657     Salicylate <1.0 (L) mg/dL     Acetaminophen Level [[id number]]  (Normal) Collected:  10/05/17 0028    Specimen:  Blood Updated:  10/05/17 0657     Acetaminophen <10.0 mcg/mL     Narrative:       Toxic = Greater than 150 mcg/mL        Imaging Results (last 24 hours)     Procedure Component Value Units Date/Time    CT Abdomen Pelvis Without Contrast [[id number]] Collected:  10/05/17 0657     Updated:  10/05/17 0704    Narrative:       PROCEDURE: CT ABDOMEN PELVIS WO CONTRAST-     HISTORY: abdominal pain     COMPARISON: October 16, 2016.     PROCEDURE: Axial images were obtained from the lung bases through the  pubic symphysis without intravenous contrast.    .      FINDINGS:      ABDOMEN: Lack of intravenous contrast limits evaluation of the solid  organs, the mediastinum, and the vasculature. There is no  nephrolithiasis. There is no hydronephrosis. The limited noncontrast  images of the liver are normal. The gallbladder is unremarkable. . The  spleen is normal. No adrenal masses are seen.  The pancreas has an  unremarkable unenhanced appearance.. The aorta is normal in caliber.  There is no significant free fluid or adenopathy.  Limited noncontrast  images of the bowel are unremarkable. Nodular density within the lateral  lingula measuring up to 8 mm, this may be secondary to atelectasis.  Follow-up chest CT in 3 months is recommended to prior stability..      PELVIS: The appendix is normal. The urinary bladder is unremarkable.  There is no significant fluid or adenopathy. The osseous structures are  intact. . The uterus is surgically absent.        Impression:       1. No hydronephrosis or nephrolithiasis.  2. 8 mm nodular density in the lateral lingula, while this may be  secondary to atelectasis, follow-up CT in 3 months is recommended.                939.9 mGy.cm.  20.37 mGy     This study was performed with techniques to keep radiation doses as low  as reasonably achievable (ALARA). Individualized dose reduction  techniques using automated exposure control or adjustment of mA and/or  kV according to the patient size were employed.      This report was finalized on 10/5/2017 7:02 AM by Kayden Henderson DO.    US Gallbladder [[id number]] Collected:  10/05/17 1007     Updated:  10/05/17 1018    Narrative:       PROCEDURE: US GALLBLADDER-     HISTORY: abd pain, nausea; R11.2-Nausea with vomiting, unspecified;  N17.9-Acute kidney failure, unspecified     PROCEDURE: Ultrasound images of the right upper quadrant were obtained.     FINDINGS: Limited images of the pancreas are unremarkable. The liver  parenchyma is fatty infiltrated. The gallbladder is normal with no  shadowing stones or wall thickening.  There is no pericholecystic fluid.   The common duct measures 4.5 mm. Limited images of the right kidney are  unremarkable.       Impression:       Fatty infiltration of the liver.           Images were reviewed, interpreted, and dictated by Dr. Henderson.  Transcribed by Kathia Abbott PA-C.     This report was finalized on 10/5/2017 10:16 AM by Kayden Henderson DO.        Orders (last 24 hrs)     Start     Ordered    10/06/17 0900  levoFLOXacin (LEVAQUIN) 250 mg/50 mL D5W (premix) 250 mg  Every 24 Hours      10/05/17 0840    10/06/17 0600  CBC Auto Differential  Morning Draw      10/05/17 0634    10/06/17 0600  Comprehensive Metabolic Panel  Morning Draw      10/05/17 0634    10/06/17 0600  Lipase  Morning Draw      10/05/17 0634    10/06/17 0600  Lipid Panel  Morning Draw      10/05/17 0634    10/06/17 0600  Magnesium  Morning Draw      10/05/17 0634    10/06/17 0600  CBC  & Differential  Morning Draw      10/05/17 0822    10/06/17 0600  Basic Metabolic Panel  Morning Draw      10/05/17 0822    10/05/17 1034  Inpatient Admission  Once      10/05/17 1034    10/05/17 1000  metroNIDAZOLE (FLAGYL) IVPB 500 mg  Every 8 Hours      10/05/17 0822    10/05/17 0915  levoFLOXacin (LEVAQUIN) 500 mg/100 mL D5W (premix) (LEVAQUIN) 500 mg  Once      10/05/17 0840    10/05/17 0900  budesonide-formoterol (SYMBICORT) 160-4.5 MCG/ACT inhaler 2 puff  Daily      10/05/17 0634    10/05/17 0900  pantoprazole (PROTONIX) EC tablet 40 mg  Daily      10/05/17 0634    10/05/17 0900  enoxaparin (LOVENOX) syringe 40 mg  Daily      10/05/17 0634    10/05/17 0900  levoFLOXacin (LEVAQUIN) 500 mg/100 mL D5W (premix) (LEVAQUIN) 500 mg  Every 24 Hours,   Status:  Discontinued      10/05/17 0822    10/05/17 0822  Inpatient Consult to Nephrology  Once     Provider:  Casper Flower MD    10/05/17 0822    10/05/17 0800  Vital Signs  Every 4 Hours      10/05/17 0634    10/05/17 0800  multiple vitamin (M.V.I. Adult) 10 mL, thiamine (B-1) 100 mg, folic acid 1 mg, magnesium sulfate 1 g in sodium chloride 0.9 % 1,000 mL infusion  Daily      10/05/17 0634    10/05/17 0715  sodium chloride 0.9 % infusion  Continuous      10/05/17 0634    10/05/17 0700  Strict Intake and Output  Every Hour      10/05/17 0634    10/05/17 0639  albuterol (PROVENTIL) nebulizer solution 0.083% 2.5 mg/3mL  Every 4 Hours PRN      10/05/17 0641    10/05/17 0636  Salicylate Level  STAT      10/05/17 0636    10/05/17 0636  Acetaminophen Level  STAT      10/05/17 0636    10/05/17 0635  Weigh Patient  Once      10/05/17 0634    10/05/17 0635  Oxygen Therapy-  Continuous      10/05/17 0634    10/05/17 0635  Insert Peripheral IV  Once      10/05/17 0634    10/05/17 0635  Saline Lock & Maintain IV Access  Continuous      10/05/17 0634    10/05/17 0635  Full Code  Continuous      10/05/17 0634    10/05/17 0635  VTE Risk Assessment - Moderate Risk  Once       10/05/17 0634    10/05/17 0635  Place Sequential Compression Device  Once      10/05/17 0634    10/05/17 0635  Maintain Sequential Compression Device  Continuous      10/05/17 0634    10/05/17 0635  Diet Clear Liquid  Diet Effective Now      10/05/17 0634    10/05/17 0635  US Gallbladder  1 Time Imaging      10/05/17 0634    10/05/17 0635  Safety Precautions  Continuous      10/05/17 0634    10/05/17 0635  Obtain Baseline Clinical [place] Withdrawal Assessment - Ar, Sedation Scale & Vital Signs  Once     Comments:  Follow CIWA Scoring Reference Guide    10/05/17 0634    10/05/17 0635  Clinical [place] Withdrawal Assessment (CIWA-Ar)  Per Hospital Policy      10/05/17 0634    10/05/17 0635  Discontinue CIWA if Scores Are Less Than 8 For 24 Hours - Restart Scoring & Protocol if Symptoms Reappear  Continuous      10/05/17 0634    10/05/17 0635  Obtain Pre & Post Sedation Scores With Every Lorazepam Dose - Hold For POSS Greater Than 2 or RASS of -3 or Less  Continuous      10/05/17 0634    10/05/17 0635  Seizure Precautions  Continuous      10/05/17 0634    10/05/17 0635  Notify Provider - Withdrawal  Until Discontinued     Comments:  Including: anxiety, agitation, severe vomiting, seizure activity.    10/05/17 0634    10/05/17 0635  Notify Provider of Abnormal Lab Results  Until Discontinued      10/05/17 0634    10/05/17 0635  Notify Provider - Vitals  Until Discontinued      10/05/17 0634    10/05/17 0634  albuterol (PROVENTIL HFA;VENTOLIN HFA) inhaler 2 puff  Every 4 Hours PRN,   Status:  Discontinued      10/05/17 0634    10/05/17 0634  morphine injection 2 mg  Every 4 Hours PRN      10/05/17 0634    10/05/17 0634  naloxone (NARCAN) injection 0.4 mg  Every 5 Minutes PRN      10/05/17 0634    10/05/17 0634  LORazepam (ATIVAN) tablet 1 mg  Every 2 Hours PRN      10/05/17 0634    10/05/17 0634  LORazepam (ATIVAN) injection 1 mg  Every 2 Hours PRN      10/05/17 0634    10/05/17 0634  LORazepam (ATIVAN) tablet 2 mg   Every 1 Hour PRN      10/05/17 0634    10/05/17 0634  LORazepam (ATIVAN) injection 2 mg  Every 1 Hour PRN      10/05/17 0634    10/05/17 0634  LORazepam (ATIVAN) injection 2 mg  Every 15 Minutes PRN      10/05/17 0634    10/05/17 0634  LORazepam (ATIVAN) injection 2 mg  Every 15 Minutes PRN      10/05/17 0634    10/05/17 0634  sodium chloride 0.9 % flush 1-10 mL  As Needed      10/05/17 0634    10/05/17 0634  acetaminophen (TYLENOL) tablet 650 mg  Every 4 Hours PRN      10/05/17 0634    10/05/17 0634  ondansetron (ZOFRAN) injection 4 mg  Every 6 Hours PRN      10/05/17 0634    10/05/17 0539  Initiate Observation Status  Once      10/05/17 0539    10/05/17 0344  sodium chloride 0.9 % infusion  Continuous,   Status:  Discontinued      10/05/17 0342    10/05/17 0315  ondansetron (ZOFRAN) injection 4 mg  Once      10/05/17 0313    10/05/17 0233  Urinalysis, Microscopic Only - Urine, Clean Catch  Once      10/05/17 0232    10/05/17 0217  hCG, Serum, Qualitative  STAT,   Status:  Canceled      10/05/17 0216    10/05/17 0137  sodium chloride 0.9 % bolus 1,000 mL  Once      10/05/17 0135    10/05/17 0118  Lactic Acid, Plasma  STAT      10/05/17 0117    10/05/17 0115  CT Abdomen Pelvis Without Contrast  1 Time Imaging     Comments:  IV CONTRAST ONLY      10/05/17 0031    10/05/17 0041  CBC Auto Differential  Once      10/05/17 0040    10/05/17 0036  ECG 12 Lead  STAT      10/05/17 0036    10/05/17 0033  sodium chloride 0.9 % bolus 2,000 mL  Once      10/05/17 0031    10/05/17 0033  Morphine injection 4 mg  Once      10/05/17 0031    10/05/17 0033  ondansetron (ZOFRAN) injection 4 mg  Once      10/05/17 0031    10/05/17 0032  Ethanol  Once      10/05/17 0031    10/05/17 0032  Lipase  Once      10/05/17 0031    10/05/17 0032  Urinalysis With / Culture If Indicated - Urine, Clean Catch  Once,   Status:  Canceled      10/05/17 0031    10/05/17 0032  Pregnancy, Urine - Urine, Clean Catch  Once,   Status:  Canceled      10/05/17  0031    10/05/17 0031  CBC & Differential  Once      10/05/17 0031    10/05/17 0031  Comprehensive Metabolic Panel  Once      10/05/17 0031    10/05/17 0031  CT Abdomen Pelvis With Contrast  1 Time Imaging,   Status:  Canceled     Comments:  IV CONTRAST ONLY      10/05/17 0031    10/05/17 0029  Troponin  Once      10/05/17 0028    10/05/17 0028  [place] Draw  Once      10/05/17 0028    10/05/17 0028  Light Blue Top  PROCEDURE ONCE      10/05/17 0028    10/05/17 0028  Lavender Top  PROCEDURE ONCE      10/05/17 0028    10/05/17 0028  Gold Top - SST  PROCEDURE ONCE      10/05/17 0028    10/05/17 0028  Green Top (No Gel)  PROCEDURE ONCE      10/05/17 0028    Unscheduled  Straight cath  As Needed      10/05/17 0154

## 2017-10-05 NOTE — PLAN OF CARE
Problem: Fall Risk (Adult)  Goal: Identify Related Risk Factors and Signs and Symptoms  Outcome: Outcome(s) achieved Date Met:  10/05/17

## 2017-10-06 VITALS
WEIGHT: 180 LBS | HEART RATE: 82 BPM | RESPIRATION RATE: 20 BRPM | SYSTOLIC BLOOD PRESSURE: 111 MMHG | BODY MASS INDEX: 28.93 KG/M2 | TEMPERATURE: 98.2 F | HEIGHT: 66 IN | DIASTOLIC BLOOD PRESSURE: 81 MMHG | OXYGEN SATURATION: 98 %

## 2017-10-06 LAB
ALBUMIN SERPL-MCNC: 4.4 G/DL (ref 3.5–5)
ALBUMIN/GLOB SERPL: 1.5 G/DL (ref 1–2)
ALP SERPL-CCNC: 59 U/L (ref 38–126)
ALT SERPL W P-5'-P-CCNC: 35 U/L (ref 13–69)
ANION GAP SERPL CALCULATED.3IONS-SCNC: 19.4 MMOL/L
AST SERPL-CCNC: 31 U/L (ref 15–46)
BASOPHILS # BLD AUTO: 0.03 10*3/MM3 (ref 0–0.2)
BASOPHILS NFR BLD AUTO: 0.5 % (ref 0–2.5)
BILIRUB SERPL-MCNC: 0.5 MG/DL (ref 0.2–1.3)
BILIRUB UR QL STRIP: ABNORMAL
BUN BLD-MCNC: 10 MG/DL (ref 7–20)
BUN/CREAT SERPL: 12.5 (ref 7.1–23.5)
CALCIUM SPEC-SCNC: 9.8 MG/DL (ref 8.4–10.2)
CHLORIDE SERPL-SCNC: 109 MMOL/L (ref 98–107)
CHOLEST SERPL-MCNC: 320 MG/DL (ref 0–199)
CLARITY UR: ABNORMAL
CO2 SERPL-SCNC: 20 MMOL/L (ref 26–30)
COLOR UR: YELLOW
CREAT BLD-MCNC: 0.8 MG/DL (ref 0.6–1.3)
DEPRECATED RDW RBC AUTO: 47.6 FL (ref 37–54)
EOSINOPHIL # BLD AUTO: 0.09 10*3/MM3 (ref 0–0.7)
EOSINOPHIL NFR BLD AUTO: 1.5 % (ref 0–7)
ERYTHROCYTE [DISTWIDTH] IN BLOOD BY AUTOMATED COUNT: 13.4 % (ref 11.5–14.5)
GFR SERPL CREATININE-BSD FRML MDRD: 78 ML/MIN/1.73
GLOBULIN UR ELPH-MCNC: 3 GM/DL
GLUCOSE BLD-MCNC: 100 MG/DL (ref 74–98)
GLUCOSE UR STRIP-MCNC: NEGATIVE MG/DL
HCT VFR BLD AUTO: 35.9 % (ref 37–47)
HDLC SERPL-MCNC: 46 MG/DL (ref 40–60)
HGB BLD-MCNC: 12.2 G/DL (ref 12–16)
HGB UR QL STRIP.AUTO: NEGATIVE
IMM GRANULOCYTES # BLD: 0.11 10*3/MM3 (ref 0–0.06)
IMM GRANULOCYTES NFR BLD: 1.8 % (ref 0–0.6)
KETONES UR QL STRIP: ABNORMAL
LDLC SERPL CALC-MCNC: ABNORMAL MG/DL (ref 0–99)
LDLC/HDLC SERPL: ABNORMAL {RATIO}
LEUKOCYTE ESTERASE UR QL STRIP.AUTO: NEGATIVE
LIPASE SERPL-CCNC: 236 U/L (ref 23–300)
LYMPHOCYTES # BLD AUTO: 2.19 10*3/MM3 (ref 0.6–3.4)
LYMPHOCYTES NFR BLD AUTO: 35.7 % (ref 10–50)
MAGNESIUM SERPL-MCNC: 2 MG/DL (ref 1.6–2.3)
MCH RBC QN AUTO: 32.6 PG (ref 27–31)
MCHC RBC AUTO-ENTMCNC: 34 G/DL (ref 30–37)
MCV RBC AUTO: 96 FL (ref 81–99)
MONOCYTES # BLD AUTO: 0.33 10*3/MM3 (ref 0–0.9)
MONOCYTES NFR BLD AUTO: 5.4 % (ref 0–12)
NEUTROPHILS # BLD AUTO: 3.39 10*3/MM3 (ref 2–6.9)
NEUTROPHILS NFR BLD AUTO: 55.1 % (ref 37–80)
NITRITE UR QL STRIP: NEGATIVE
NRBC BLD MANUAL-RTO: 0 /100 WBC (ref 0–0)
PH UR STRIP.AUTO: 6 [PH] (ref 5–8)
PLATELET # BLD AUTO: 163 10*3/MM3 (ref 130–400)
PMV BLD AUTO: 11.1 FL (ref 6–12)
POTASSIUM BLD-SCNC: 3.4 MMOL/L (ref 3.5–5.1)
PROT SERPL-MCNC: 7.4 G/DL (ref 6.3–8.2)
PROT UR QL STRIP: NEGATIVE
RBC # BLD AUTO: 3.74 10*6/MM3 (ref 4.2–5.4)
SODIUM BLD-SCNC: 145 MMOL/L (ref 137–145)
SP GR UR STRIP: >=1.03 (ref 1–1.03)
TRIGL SERPL-MCNC: 432 MG/DL
UROBILINOGEN UR QL STRIP: ABNORMAL
VLDLC SERPL-MCNC: ABNORMAL MG/DL
WBC NRBC COR # BLD: 6.14 10*3/MM3 (ref 4.8–10.8)

## 2017-10-06 PROCEDURE — 94799 UNLISTED PULMONARY SVC/PX: CPT

## 2017-10-06 PROCEDURE — 81003 URINALYSIS AUTO W/O SCOPE: CPT | Performed by: INTERNAL MEDICINE

## 2017-10-06 PROCEDURE — 80061 LIPID PANEL: CPT | Performed by: INTERNAL MEDICINE

## 2017-10-06 PROCEDURE — 25010000002 ENOXAPARIN PER 10 MG: Performed by: INTERNAL MEDICINE

## 2017-10-06 PROCEDURE — 80053 COMPREHEN METABOLIC PANEL: CPT | Performed by: INTERNAL MEDICINE

## 2017-10-06 PROCEDURE — 25010000002 MAGNESIUM SULFATE PER 500 MG OF MAGNESIUM: Performed by: INTERNAL MEDICINE

## 2017-10-06 PROCEDURE — 99239 HOSP IP/OBS DSCHRG MGMT >30: CPT | Performed by: INTERNAL MEDICINE

## 2017-10-06 PROCEDURE — 83690 ASSAY OF LIPASE: CPT | Performed by: INTERNAL MEDICINE

## 2017-10-06 PROCEDURE — 85025 COMPLETE CBC W/AUTO DIFF WBC: CPT | Performed by: INTERNAL MEDICINE

## 2017-10-06 PROCEDURE — 83735 ASSAY OF MAGNESIUM: CPT | Performed by: INTERNAL MEDICINE

## 2017-10-06 PROCEDURE — 25010000002 THIAMINE PER 100 MG: Performed by: INTERNAL MEDICINE

## 2017-10-06 PROCEDURE — 25010000002 LEVOFLOXACIN PER 250 MG: Performed by: INTERNAL MEDICINE

## 2017-10-06 RX ORDER — LISINOPRIL 5 MG/1
5 TABLET ORAL DAILY
COMMUNITY

## 2017-10-06 RX ADMIN — DIAZEPAM 5 MG: 5 TABLET ORAL at 09:13

## 2017-10-06 RX ADMIN — SODIUM BICARBONATE 650 MG TABLET 1300 MG: at 09:13

## 2017-10-06 RX ADMIN — PANTOPRAZOLE SODIUM 40 MG: 40 TABLET, DELAYED RELEASE ORAL at 09:13

## 2017-10-06 RX ADMIN — METRONIDAZOLE 500 MG: 500 INJECTION, SOLUTION INTRAVENOUS at 04:47

## 2017-10-06 RX ADMIN — FOLIC ACID 100 ML/HR: 5 INJECTION, SOLUTION INTRAMUSCULAR; INTRAVENOUS; SUBCUTANEOUS at 09:10

## 2017-10-06 RX ADMIN — LEVOFLOXACIN 250 MG: 5 INJECTION, SOLUTION INTRAVENOUS at 09:13

## 2017-10-06 RX ADMIN — ENOXAPARIN SODIUM 40 MG: 40 INJECTION SUBCUTANEOUS at 09:13

## 2017-10-06 RX ADMIN — BUDESONIDE AND FORMOTEROL FUMARATE DIHYDRATE 2 PUFF: 160; 4.5 AEROSOL RESPIRATORY (INHALATION) at 07:17

## 2017-10-06 NOTE — DISCHARGE SUMMARY
[facility]      Name:  Agatha Avendano   Age:  42 y.o.  Sex:  female  :  1975  MRN:  [id number]   Visit Number:  [id number]  Primary Care Physician:  FREDRICK Patel  Date of Discharge:  10/6/2017  Admission Date:  10/5/2017      Discharge Diagnosis:     Principal Problem:    Non-intractable vomiting with nausea  Active Problems:    HTN (hypertension)    COPD (chronic obstructive pulmonary disease)    Anxiety and depression    MARSHA (acute kidney injury)    Alcoholism         Consults:     Consults     Date and Time Order Name Status Description    10/5/2017 0822 Inpatient Consult to Nephrology Completed                Hospital Course:  The patient was admitted on 10/5/2017, please see H&P for further details of HPI and ROS.    Patient is a 42-year-old  female with history of hypertension, COPD, nephrolithiasis and pancreatitis as well as alcoholism who comes in with on and off nausea/vomiting for several days as well as epigastric abdominal pain yesterday.   She reported no diarrhea or dysuria.  She had no cough for significant soa. Patient appeared very dehydrated and her HCO3 is 6 with creatinine of 2.0 and BUN of 24.  Her CXR/UA showed no acute infection.   Her radiologic workup included the following:    CT Abdomen/Pelvis  Impression:         1. No hydronephrosis or nephrolithiasis.  2. 8 mm nodular density in the lateral lingula, while this may be  secondary to atelectasis, follow-up CT in 3 months is recommended.               RUQ US:    Impression:         Fatty infiltration of the liver.       CXR:  Streak-like densities likely secondary to atelectasis. No  focal consolidate.       Patient was given IV fluids and placed on a HCO3 drip.  Her elevated white count was likely related to stress reactant but she was placed on levaquin/flagyl to cover acute gastroenteritis.  Today patient reports feeling back to her usual self.  Her diet has  been advanced as tolerated. She has no current n/v or abdominal pain.  No dysuria, diarhea, soa or cp.  Her labs today show Na of 145, K 3.4, HCO3 of 20, BUN/Creat of 10/0.8 and a white count of 12.2. Will hold off on abx as patient likely had an acute viral gastroenteritis.  Her white count today has normalized and she is afebrile without symptoms. Patient is stable for DC home today with continued f/u with her PCP accordingly.     Vital Signs:    Temp:  [97.9 °F (36.6 °C)-98.6 °F (37 °C)] 98.2 °F (36.8 °C)  Heart Rate:  [] 82  Resp:  [16-20] 20  BP: (107-111)/(68-81) 111/81          Physical Examination:    General Appearance:    Alert and cooperative, not in any acute distress.   Head:    Atraumatic and normocephalic, without obvious abnormality.   Eyes:        PERRLA, Extra-occular movements are within normal limits.    Lungs:     Chest shape is normal. Breath sounds heard bilaterally equally.  No crackles or wheezing.    Heart:    Normal S1 and S2, no murmur, no gallop, no rub.   Abdomen:     Normal bowel sounds,  Soft non-tender, non-distended, no guarding, no rebound tenderness   Extremities:   Moves all extremities well, no edema             Skin:   No bruising or rash.   Neurologic:  Grossly non focal and moves all extremities equally.          Pertinent Lab Results:     Lab Results (last 24 hours)     Procedure Component Value Units Date/Time    CBC Auto Differential [[id number]]  (Abnormal) Collected:  10/06/17 0645    Specimen:  Blood Updated:  10/06/17 0741     WBC 6.14 10*3/mm3      RBC 3.74 (L) 10*6/mm3      Hemoglobin 12.2 g/dL      Hematocrit 35.9 (L) %      MCV 96.0 fL      MCH 32.6 (H) pg      MCHC 34.0 g/dL      RDW 13.4 %      RDW-SD 47.6 fl      MPV 11.1 fL      Platelets 163 10*3/mm3      Neutrophil % 55.1 %      Lymphocyte % 35.7 %      Monocyte % 5.4 %      Eosinophil % 1.5 %      Basophil % 0.5 %      Immature Grans % 1.8 (H) %      Neutrophils, Absolute 3.39 10*3/mm3      Lymphocytes,  Absolute 2.19 10*3/mm3      Monocytes, Absolute 0.33 10*3/mm3      Eosinophils, Absolute 0.09 10*3/mm3      Basophils, Absolute 0.03 10*3/mm3      Immature Grans, Absolute 0.11 (H) 10*3/mm3      nRBC 0.0 /100 WBC     CBC & Differential [[id number]] Collected:  10/06/17 0645    Specimen:  Blood Updated:  10/06/17 0741    Narrative:       The following orders were created for panel order CBC & Differential.  Procedure                               Abnormality         Status                     ---------                               -----------         ------                     CBC Auto Differential[[id number]]        Abnormal            Final result                 Please view results for these tests on the individual orders.    Magnesium [[id number]]  (Normal) Collected:  10/06/17 0645    Specimen:  Blood Updated:  10/06/17 0749     Magnesium 2.0 mg/dL     Lipase [[id number]]  (Normal) Collected:  10/06/17 0645    Specimen:  Blood Updated:  10/06/17 0749     Lipase 236 U/L     Lipid Panel [[id number]]  (Abnormal) Collected:  10/06/17 0645    Specimen:  Blood Updated:  10/06/17 0749     Total Cholesterol 320 (H) mg/dL      Triglycerides 432 (H) mg/dL      HDL Cholesterol 46 mg/dL      LDL Cholesterol  -- mg/dL       Unable to calculate        VLDL Cholesterol -- mg/dL       Unable to calculate        LDL/HDL Ratio --      Unable to calculate       Narrative:       Reference ranges for triglycerides, VLDL cholesterol, LDL cholesterol, and HDL cholesterol are not true population normal ranges but are threshold levels for increased risk of coronary artery disease established by ATP III guidelines from the National Cholesterol Education Program.    Comprehensive Metabolic Panel [[id number]]  (Abnormal) Collected:  10/06/17 0645    Specimen:  Blood Updated:  10/06/17 0751     Glucose 100 (H) mg/dL      BUN 10 mg/dL      Creatinine 0.80 mg/dL      Sodium 145 mmol/L      Potassium 3.4 (L) mmol/L      Chloride 109 (H) mmol/L       CO2 20.0 (L) mmol/L      Calcium 9.8 mg/dL      Total Protein 7.4 g/dL      Albumin 4.40 g/dL      ALT (SGPT) 35 U/L      AST (SGOT) 31 U/L      Alkaline Phosphatase 59 U/L      Total Bilirubin 0.5 mg/dL      eGFR Non African Amer 78 mL/min/1.73      Globulin 3.0 gm/dL      A/G Ratio 1.5 g/dL      BUN/Creatinine Ratio 12.5     Anion Gap 19.4 mmol/L     Narrative:       Abnormal estimated GFR should be followed by more specific studies to confirm end stage chronic renal disease. The equation used for calculation may not be accurate for patients less than 19 years old, greater than 70 years old, patients at extremes of weight, malnutrition, or with acute renal dysfunction.    Urinalysis With / Culture If Indicated - Urine, Clean Catch [[id number]]  (Abnormal) Collected:  10/06/17 0859    Specimen:  Urine from Urine, Clean Catch Updated:  10/06/17 0909     Color, UA Yellow     Appearance, UA Slightly Cloudy (A)     pH, UA 6.0     Specific Gravity, UA >=1.030     Glucose, UA Negative     Ketones, UA 80 mg/dL (3+) (A)     Bilirubin, UA Small (1+) (A)     Blood, UA Negative     Protein, UA Negative     Leuk Esterase, UA Negative     Nitrite, UA Negative     Urobilinogen, UA 0.2 E.U./dL    Narrative:       Urine microscopic not indicated.          Pertinent Radiology Results:  Imaging Results (most recent)     Procedure Component Value Units Date/Time    CT Abdomen Pelvis Without Contrast [[id number]] Collected:  10/05/17 0657     Updated:  10/05/17 0704    Narrative:       PROCEDURE: CT ABDOMEN PELVIS WO CONTRAST-     HISTORY: abdominal pain     COMPARISON: October 16, 2016.     PROCEDURE: Axial images were obtained from the lung bases through the  pubic symphysis without intravenous contrast.    .      FINDINGS:      ABDOMEN: Lack of intravenous contrast limits evaluation of the solid  organs, the mediastinum, and the vasculature. There is no  nephrolithiasis. There is no hydronephrosis. The limited noncontrast  images of  the liver are normal. The gallbladder is unremarkable. . The  spleen is normal. No adrenal masses are seen.  The pancreas has an  unremarkable unenhanced appearance.. The aorta is normal in caliber.  There is no significant free fluid or adenopathy.  Limited noncontrast  images of the bowel are unremarkable. Nodular density within the lateral  lingula measuring up to 8 mm, this may be secondary to atelectasis.  Follow-up chest CT in 3 months is recommended to prior stability..      PELVIS: The appendix is normal. The urinary bladder is unremarkable.  There is no significant fluid or adenopathy. The osseous structures are  intact. . The uterus is surgically absent.       Impression:       1. No hydronephrosis or nephrolithiasis.  2. 8 mm nodular density in the lateral lingula, while this may be  secondary to atelectasis, follow-up CT in 3 months is recommended.                939.9 mGy.cm.  20.37 mGy     This study was performed with techniques to keep radiation doses as low  as reasonably achievable (ALARA). Individualized dose reduction  techniques using automated exposure control or adjustment of mA and/or  kV according to the patient size were employed.      This report was finalized on 10/5/2017 7:02 AM by Kayden Henderson DO.    US Gallbladder [[id number]] Collected:  10/05/17 1007     Updated:  10/05/17 1018    Narrative:       PROCEDURE: US GALLBLADDER-     HISTORY: abd pain, nausea; R11.2-Nausea with vomiting, unspecified;  N17.9-Acute kidney failure, unspecified     PROCEDURE: Ultrasound images of the right upper quadrant were obtained.     FINDINGS: Limited images of the pancreas are unremarkable. The liver  parenchyma is fatty infiltrated. The gallbladder is normal with no  shadowing stones or wall thickening.  There is no pericholecystic fluid.   The common duct measures 4.5 mm. Limited images of the right kidney are  unremarkable.       Impression:       Fatty infiltration of the liver.           Images were  reviewed, interpreted, and dictated by Dr. Henderson.  Transcribed by Kathia Abbott PA-C.     This report was finalized on 10/5/2017 10:16 AM by Kayden Henderson DO.    XR Chest 1 View [[id number]] Collected:  10/05/17 1325     Updated:  10/05/17 1355    Narrative:       PROCEDURE: XR CHEST 1 VW-     HISTORY: Evaluation for pneumonia; R11.2-Nausea with vomiting,  unspecified; N17.9-Acute kidney failure, unspecified     COMPARISON: None.     FINDINGS: The heart is normal in size. The mediastinum is unremarkable.  Streak-like densities are likely secondary to atelectasis. There is no  focal consolidate. There is no pneumothorax.  There are no acute osseous  abnormalities.           Impression:       Streak-like densities likely secondary to atelectasis. No  focal consolidate.     Continued followup is recommended.        Images were reviewed, interpreted, and dictated by Dr. Henderson.  Transcribed by Kathia Abbott PA-C.     This report was finalized on 10/5/2017 1:53 PM by Kayden Henderson DO.            Code Status:  FULL     Discharge Disposition:    Home or Self Care    Discharge Medication:     Agatha Avendano   Home Medication Instructions JENNA:[id number]    Printed on:10/06/17 1353   Medication Information                      albuterol (PROAIR RESPICLICK) 108 (90 Base) MCG/ACT inhaler  Inhale 2 puffs Every 4 (Four) Hours As Needed for Wheezing.             albuterol (PROVENTIL HFA;VENTOLIN HFA) 108 (90 BASE) MCG/ACT inhaler  Inhale 2 puffs Daily.             Aspirin-Acetaminophen-Caffeine (EXCEDRIN MIGRAINE PO)  Take  by mouth.             budesonide-formoterol (SYMBICORT) 160-4.5 MCG/ACT inhaler  Inhale 2 puffs 2 (Two) Times a Day.             cyclobenzaprine (FLEXERIL) 10 MG tablet  Take 1 tablet by mouth 3 (Three) Times a Day.             lisinopril (PRINIVIL,ZESTRIL) 5 MG tablet  Take 5 mg by mouth Daily.             meloxicam (MOBIC) 15 MG tablet  Take 1 tablet by mouth Daily.             pantoprazole (PROTONIX)  20 MG EC tablet  Take 20 mg by mouth Daily.             QUEtiapine (SEROquel) 100 MG tablet  Take 100 mg by mouth Every Night. Patient says she is taking this on a PRN basis.                 Discharge Diet:     Diet Instructions     Diet: Cardiac; Thin       Discharge Diet:  Cardiac   Fluid Consistency:  Thin                 Activity at Discharge:     Activity Instructions     Activity as Tolerated                     Follow-up Appointments:    Follow-up Information     Follow up with FREDRICK Patel Follow up on 10/12/2017.    Specialty:  Family Medicine    Why:  F/U with PCP in one week for general health maintenance. THURSDAY 10/12/17 @130PM    Contact information:    [address]  [place] [address]  [phone]          Follow up with FREDRICK Patel .    Specialty:  Family Medicine    Why:  F/U with PCP in one week for general health maintenance. Also f/u with CT of chest in three monts for an 8 mm nodular density in the lateral lingula/    Contact information:    [address]  [place] [address]  [phone]                  Darrius Arvizu MD  10/06/17  1:53 PM    Time: Discharge 36 min

## 2017-10-06 NOTE — PLAN OF CARE
Problem: Patient Care Overview (Adult)  Goal: Plan of Care Review  Outcome: Ongoing (interventions implemented as appropriate)    10/06/17 0501   Coping/Psychosocial Response Interventions   Plan Of Care Reviewed With patient   Patient Care Overview   Progress improving         Problem: Fall Risk (Adult)  Goal: Absence of Falls  Outcome: Ongoing (interventions implemented as appropriate)    10/06/17 0501   Fall Risk (Adult)   Absence of Falls making progress toward outcome         Problem: Acute Alcohol Withdrawal Syndrome, Risk For/Actual (Adult)  Goal: Signs and Symptoms of Listed Potential Problems Will be Absent or Manageable (Acute Alcohol Withdrawal Syndrome, Risk For/Actual)  Outcome: Ongoing (interventions implemented as appropriate)    10/06/17 0501   Acute Alcohol Withdrawal Syndrome, Risk For/Actual   Problems Present (Alcohol Withdrawal Syndrome) situational response

## 2017-10-06 NOTE — PROGRESS NOTES
"Nephrology Progress Note.    LOS: 1 day    Patient Care Team:  FREDRICK Patel as PCP - General    Chief Complaint:    Chief Complaint   Patient presents with   • Abdominal Pain       Subjective     Patient seen and examined this morning.  Events from last night noted.  Patient denies having any fevers chills.  No nausea or vomiting no abdominal pain.  Denies any chest pain shortness of breath cough or sputum production.  There is no significant edema.   Patient also denies having new onset weakness of numbness of either extremity.      Review of Systems:    The pertinent  ROS was done and it is noted above, rest  was negative.    Objective     Vital Signs  /68 (BP Location: Left arm, Patient Position: Lying)  Pulse 88  Temp 97.9 °F (36.6 °C)  Resp 16  Ht 66\" (167.6 cm)  Wt 180 lb (81.6 kg)  SpO2 98%  BMI 29.05 kg/m2           Intake/Output Summary (Last 24 hours) at 10/06/17 0745  Last data filed at 10/05/17 2131   Gross per 24 hour   Intake             1930 ml   Output             2650 ml   Net             -720 ml       Physical Exam:    General Appearance: alert, oriented x 3, no acute distress,   HEENT: pupils round and reactive to light, oral mucosa dry, extra occular movements intact.  Neck: supple, no JVD, trachea midline  Lungs: Clear to Auscultation, unlabored breathing effort  Heart: RRR, normal S1 and S2, no S3, no rub  Abdomen: soft, non-tender, no palpable bladder, present bowel sounds to auscultation  Extremities: no edema, cyanosis or clubbing.   Neuro: normal speech and mental status, grossly non focal.     Results Review:      Results from last 7 days  Lab Units 10/05/17  0028   SODIUM mmol/L 135*   POTASSIUM mmol/L 5.2*   CHLORIDE mmol/L 99   CO2 mmol/L 6.0*   BUN mg/dL 24*   CREATININE mg/dL 2.00*   CALCIUM mg/dL 11.0*   BILIRUBIN mg/dL 0.9   ALK PHOS U/L 90   ALT (SGPT) U/L 31   AST (SGOT) U/L 34   GLUCOSE mg/dL 168*       Estimated Creatinine Clearance: 39 mL/min (by C-G " formula based on Cr of 2).                  Results from last 7 days  Lab Units 10/06/17  0645 10/05/17  0028   WBC 10*3/mm3 6.14 19.35*   HEMOGLOBIN g/dL 12.2 15.1   PLATELETS 10*3/mm3 163 261               Imaging Results (last 24 hours)     Procedure Component Value Units Date/Time    US Gallbladder [[id number]] Collected:  10/05/17 1007     Updated:  10/05/17 1018    Narrative:       PROCEDURE: US GALLBLADDER-     HISTORY: abd pain, nausea; R11.2-Nausea with vomiting, unspecified;  N17.9-Acute kidney failure, unspecified     PROCEDURE: Ultrasound images of the right upper quadrant were obtained.     FINDINGS: Limited images of the pancreas are unremarkable. The liver  parenchyma is fatty infiltrated. The gallbladder is normal with no  shadowing stones or wall thickening.  There is no pericholecystic fluid.   The common duct measures 4.5 mm. Limited images of the right kidney are  unremarkable.       Impression:       Fatty infiltration of the liver.           Images were reviewed, interpreted, and dictated by Dr. Henderson.  Transcribed by Kathia Abbott PA-C.     This report was finalized on 10/5/2017 10:16 AM by Kayden Henderson DO.    XR Chest 1 View [[id number]] Collected:  10/05/17 1325     Updated:  10/05/17 1355    Narrative:       PROCEDURE: XR CHEST 1 VW-     HISTORY: Evaluation for pneumonia; R11.2-Nausea with vomiting,  unspecified; N17.9-Acute kidney failure, unspecified     COMPARISON: None.     FINDINGS: The heart is normal in size. The mediastinum is unremarkable.  Streak-like densities are likely secondary to atelectasis. There is no  focal consolidate. There is no pneumothorax.  There are no acute osseous  abnormalities.           Impression:       Streak-like densities likely secondary to atelectasis. No  focal consolidate.     Continued followup is recommended.        Images were reviewed, interpreted, and dictated by Dr. Henderson.  Transcribed by Kathia Abbott PA-C.     This report was finalized on  10/5/2017 1:53 PM by Kayden Henderson DO.          budesonide-formoterol 2 puff Inhalation Daily   diazePAM 5 mg Oral Daily   enoxaparin 40 mg Subcutaneous Daily   levoFLOXacin 250 mg Intravenous Q24H   metroNIDAZOLE 500 mg Intravenous Q8H   IV Fluids 1000 mL + additives 100 mL/hr Intravenous Daily   ondansetron 4 mg Intravenous Once   pantoprazole 40 mg Oral Daily   sodium bicarbonate 1,300 mg Oral TID       custom IV infusion builder  Last Rate: 50 mL/hr at 10/05/17 2131   sodium chloride 150 mL/hr Last Rate: 150 mL/hr (10/05/17 0846)       Medication Review:   Current Facility-Administered Medications   Medication Dose Route Frequency Provider Last Rate Last Dose   • acetaminophen (TYLENOL) tablet 650 mg  650 mg Oral Q4H PRN Steven Aldana DO   650 mg at 10/05/17 2131   • albuterol (PROVENTIL) nebulizer solution 0.083% 2.5 mg/3mL  2.5 mg Nebulization Q4H PRN Steven Aldana DO       • budesonide-formoterol (SYMBICORT) 160-4.5 MCG/ACT inhaler 2 puff  2 puff Inhalation Daily Steven Aldana DO   2 puff at 10/06/17 0717   • dextrose 5 % 900 mL with sodium bicarbonate 8.4 % 100 mEq infusion   Intravenous Continuous Casper Flower MD 50 mL/hr at 10/05/17 2131     • diazePAM (VALIUM) tablet 5 mg  5 mg Oral Daily Darrius Arvizu MD   5 mg at 10/05/17 1711   • enoxaparin (LOVENOX) syringe 40 mg  40 mg Subcutaneous Daily Steven Aldana DO   40 mg at 10/05/17 0905   • levoFLOXacin (LEVAQUIN) 250 mg/50 mL D5W (premix) 250 mg  250 mg Intravenous Q24H Darrius Arvizu MD       • LORazepam (ATIVAN) tablet 1 mg  1 mg Oral Q2H PRN Steven Aldana DO        Or   • LORazepam (ATIVAN) injection 1 mg  1 mg Intravenous Q2H PRN Steven Aldana DO        Or   • LORazepam (ATIVAN) tablet 2 mg  2 mg Oral Q1H PRN Steven Aldana DO        Or   • LORazepam (ATIVAN) injection 2 mg  2 mg Intravenous Q1H PRN Steven Aldana, DO        Or   • LORazepam (ATIVAN) injection 2 mg  2 mg Intravenous Q15 Min PRN  Steven Aldana DO        Or   • LORazepam (ATIVAN) injection 2 mg  2 mg Intramuscular Q15 Min PRN Steven Aldana DO       • metroNIDAZOLE (FLAGYL) IVPB 500 mg  500 mg Intravenous Q8H Darrius Arvizu MD   500 mg at 10/06/17 0447   • morphine injection 2 mg  2 mg Intravenous Q4H PRN Steven Aldana DO        And   • naloxone (NARCAN) injection 0.4 mg  0.4 mg Intravenous Q5 Min PRN Steven Aldana DO       • multiple vitamin (M.V.I. Adult) 10 mL, thiamine (B-1) 100 mg, folic acid 1 mg, magnesium sulfate 1 g in sodium chloride 0.9 % 1,000 mL infusion  100 mL/hr Intravenous Daily Steven Aldana  mL/hr at 10/05/17 0905 100 mL/hr at 10/05/17 0905   • ondansetron (ZOFRAN) injection 4 mg  4 mg Intravenous Once Lia Schrader MD       • ondansetron (ZOFRAN) injection 4 mg  4 mg Intravenous Q6H PRN Steven Aldana DO       • pantoprazole (PROTONIX) EC tablet 40 mg  40 mg Oral Daily Steven Aldana DO   40 mg at 10/05/17 0905   • sodium bicarbonate tablet 1,300 mg  1,300 mg Oral TID Casper Flower MD   1,300 mg at 10/05/17 2131   • sodium chloride 0.9 % flush 1-10 mL  1-10 mL Intravenous PRN Steven Aldana DO       • sodium chloride 0.9 % infusion  150 mL/hr Intravenous Continuous Steven Aldana  mL/hr at 10/05/17 0846 150 mL/hr at 10/05/17 0846       Assessment/Plan     1.   MARSHA (acute kidney injury)  2.   Non-intractable vomiting with nausea  3.   HTN (hypertension)  4.   COPD (chronic obstructive pulmonary disease)  5.   Anxiety and depression  6.   Alcoholism  7.   Pancreatitis    Plan:  Patient appears fairly comfortable at this point.  She has been able to eat and drink a regular meal.  Her acute renal failure it is completely resolved.  Clinically she does appear to have adequate recovered to her baseline.  She wants to go home today she does not need any follow-up with me at this point.    Details were discussed with the patient as well as family in the  room.  Further recommendations will depend on clinical course of the patient during the current hospitalization.      I also discussed the details with the nursing staff.    Rest as ordered.    Casper Flower MD  10/06/17  7:45 AM      EMR Dragon/Transcription disclaimer:   Much of this encounter note is an electronic transcription/translation of spoken language to printed text. The electronic translation of spoken language may permit erroneous, or at times, nonsensical words or phrases to be inadvertently transcribed; Although I have reviewed the note for such errors, some may still exist.

## 2017-10-11 ENCOUNTER — TRANSCRIBE ORDERS (OUTPATIENT)
Dept: ADMINISTRATIVE | Facility: HOSPITAL | Age: 42
End: 2017-10-11

## 2017-10-11 ENCOUNTER — APPOINTMENT (OUTPATIENT)
Dept: LAB | Facility: HOSPITAL | Age: 42
End: 2017-10-11

## 2017-10-11 DIAGNOSIS — R11.0 NAUSEA: Primary | ICD-10-CM

## 2017-10-11 LAB
ALBUMIN SERPL-MCNC: 4.2 G/DL (ref 3.5–5)
ALBUMIN/GLOB SERPL: 1.6 G/DL (ref 1–2)
ALP SERPL-CCNC: 56 U/L (ref 38–126)
ALT SERPL W P-5'-P-CCNC: 42 U/L (ref 13–69)
AMYLASE SERPL-CCNC: 42 U/L (ref 30–110)
ANION GAP SERPL CALCULATED.3IONS-SCNC: 15.2 MMOL/L
AST SERPL-CCNC: 29 U/L (ref 15–46)
BASOPHILS # BLD AUTO: 0.06 10*3/MM3 (ref 0–0.2)
BASOPHILS NFR BLD AUTO: 0.7 % (ref 0–2.5)
BILIRUB SERPL-MCNC: 0.3 MG/DL (ref 0.2–1.3)
BUN BLD-MCNC: 11 MG/DL (ref 7–20)
BUN/CREAT SERPL: 15.7 (ref 7.1–23.5)
CALCIUM SPEC-SCNC: 9.5 MG/DL (ref 8.4–10.2)
CHLORIDE SERPL-SCNC: 105 MMOL/L (ref 98–107)
CO2 SERPL-SCNC: 26 MMOL/L (ref 26–30)
CREAT BLD-MCNC: 0.7 MG/DL (ref 0.6–1.3)
DEPRECATED RDW RBC AUTO: 47.3 FL (ref 37–54)
EOSINOPHIL # BLD AUTO: 0.18 10*3/MM3 (ref 0–0.7)
EOSINOPHIL NFR BLD AUTO: 2 % (ref 0–7)
ERYTHROCYTE [DISTWIDTH] IN BLOOD BY AUTOMATED COUNT: 13.2 % (ref 11.5–14.5)
GFR SERPL CREATININE-BSD FRML MDRD: 91 ML/MIN/1.73
GLOBULIN UR ELPH-MCNC: 2.6 GM/DL
GLUCOSE BLD-MCNC: 99 MG/DL (ref 74–98)
HCT VFR BLD AUTO: 33.5 % (ref 37–47)
HGB BLD-MCNC: 11.2 G/DL (ref 12–16)
IMM GRANULOCYTES # BLD: 0.06 10*3/MM3 (ref 0–0.06)
IMM GRANULOCYTES NFR BLD: 0.7 % (ref 0–0.6)
LIPASE SERPL-CCNC: 143 U/L (ref 23–300)
LYMPHOCYTES # BLD AUTO: 2.37 10*3/MM3 (ref 0.6–3.4)
LYMPHOCYTES NFR BLD AUTO: 26.7 % (ref 10–50)
MCH RBC QN AUTO: 32.7 PG (ref 27–31)
MCHC RBC AUTO-ENTMCNC: 33.4 G/DL (ref 30–37)
MCV RBC AUTO: 98 FL (ref 81–99)
MONOCYTES # BLD AUTO: 0.53 10*3/MM3 (ref 0–0.9)
MONOCYTES NFR BLD AUTO: 6 % (ref 0–12)
NEUTROPHILS # BLD AUTO: 5.69 10*3/MM3 (ref 2–6.9)
NEUTROPHILS NFR BLD AUTO: 63.9 % (ref 37–80)
NRBC BLD MANUAL-RTO: 0 /100 WBC (ref 0–0)
PLATELET # BLD AUTO: 194 10*3/MM3 (ref 130–400)
PMV BLD AUTO: 11 FL (ref 6–12)
POTASSIUM BLD-SCNC: 4.2 MMOL/L (ref 3.5–5.1)
PROT SERPL-MCNC: 6.8 G/DL (ref 6.3–8.2)
RBC # BLD AUTO: 3.42 10*6/MM3 (ref 4.2–5.4)
SODIUM BLD-SCNC: 142 MMOL/L (ref 137–145)
WBC NRBC COR # BLD: 8.89 10*3/MM3 (ref 4.8–10.8)

## 2017-10-11 PROCEDURE — 82150 ASSAY OF AMYLASE: CPT | Performed by: NURSE PRACTITIONER

## 2017-10-11 PROCEDURE — 85025 COMPLETE CBC W/AUTO DIFF WBC: CPT | Performed by: NURSE PRACTITIONER

## 2017-10-11 PROCEDURE — 36415 COLL VENOUS BLD VENIPUNCTURE: CPT | Performed by: NURSE PRACTITIONER

## 2017-10-11 PROCEDURE — 80053 COMPREHEN METABOLIC PANEL: CPT | Performed by: NURSE PRACTITIONER

## 2017-10-11 PROCEDURE — 83690 ASSAY OF LIPASE: CPT | Performed by: NURSE PRACTITIONER

## 2017-11-21 ENCOUNTER — OFFICE VISIT (OUTPATIENT)
Dept: GASTROENTEROLOGY | Facility: CLINIC | Age: 42
End: 2017-11-21

## 2017-11-21 ENCOUNTER — PREP FOR SURGERY (OUTPATIENT)
Dept: OTHER | Facility: HOSPITAL | Age: 42
End: 2017-11-21

## 2017-11-21 VITALS
HEART RATE: 98 BPM | BODY MASS INDEX: 29.57 KG/M2 | WEIGHT: 184 LBS | RESPIRATION RATE: 16 BRPM | HEIGHT: 66 IN | DIASTOLIC BLOOD PRESSURE: 79 MMHG | SYSTOLIC BLOOD PRESSURE: 122 MMHG | TEMPERATURE: 98.2 F

## 2017-11-21 DIAGNOSIS — R10.13 EPIGASTRIC PAIN: Primary | ICD-10-CM

## 2017-11-21 DIAGNOSIS — R12 HEARTBURN: ICD-10-CM

## 2017-11-21 DIAGNOSIS — R11.0 NAUSEA: ICD-10-CM

## 2017-11-21 DIAGNOSIS — R19.7 DIARRHEA, UNSPECIFIED TYPE: Chronic | ICD-10-CM

## 2017-11-21 DIAGNOSIS — R13.10 DYSPHAGIA, UNSPECIFIED TYPE: ICD-10-CM

## 2017-11-21 DIAGNOSIS — D64.9 ANEMIA, UNSPECIFIED TYPE: Chronic | ICD-10-CM

## 2017-11-21 DIAGNOSIS — R11.0 NAUSEA: Chronic | ICD-10-CM

## 2017-11-21 DIAGNOSIS — R10.13 EPIGASTRIC PAIN: Primary | Chronic | ICD-10-CM

## 2017-11-21 DIAGNOSIS — R93.2 ABNORMAL ULTRASOUND OF LIVER: Chronic | ICD-10-CM

## 2017-11-21 DIAGNOSIS — R12 HEARTBURN: Chronic | ICD-10-CM

## 2017-11-21 DIAGNOSIS — R13.10 DYSPHAGIA, UNSPECIFIED TYPE: Chronic | ICD-10-CM

## 2017-11-21 DIAGNOSIS — D64.9 ANEMIA, UNSPECIFIED TYPE: ICD-10-CM

## 2017-11-21 PROCEDURE — 99214 OFFICE O/P EST MOD 30 MIN: CPT | Performed by: NURSE PRACTITIONER

## 2017-11-21 RX ORDER — DEXLANSOPRAZOLE 60 MG/1
60 CAPSULE, DELAYED RELEASE ORAL DAILY
COMMUNITY
End: 2018-01-31 | Stop reason: SDUPTHER

## 2017-11-21 RX ORDER — DIAZEPAM 5 MG/1
1 TABLET ORAL 2 TIMES DAILY PRN
Refills: 0 | COMMUNITY
Start: 2017-11-16

## 2017-11-21 RX ORDER — PANTOPRAZOLE SODIUM 40 MG/1
40 TABLET, DELAYED RELEASE ORAL AS NEEDED
COMMUNITY
End: 2018-01-31 | Stop reason: ALTCHOICE

## 2017-11-21 RX ORDER — ONDANSETRON HYDROCHLORIDE 8 MG/1
1 TABLET, FILM COATED ORAL EVERY 8 HOURS
Refills: 0 | COMMUNITY
Start: 2017-11-16

## 2017-11-21 RX ORDER — BUPROPION HYDROCHLORIDE 150 MG/1
1 TABLET ORAL DAILY
Refills: 0 | COMMUNITY
Start: 2017-11-09 | End: 2018-06-09

## 2017-11-21 RX ORDER — DISULFIRAM 500 MG/1
500 TABLET ORAL DAILY
COMMUNITY

## 2017-11-21 RX ORDER — ALBUTEROL SULFATE 90 UG/1
2 AEROSOL, METERED RESPIRATORY (INHALATION) AS NEEDED
COMMUNITY
Start: 2015-03-23 | End: 2017-11-21 | Stop reason: SDUPTHER

## 2017-11-21 RX ORDER — HYDROCHLOROTHIAZIDE 12.5 MG/1
25 TABLET ORAL DAILY PRN
COMMUNITY
End: 2018-06-09

## 2017-11-21 RX ORDER — SODIUM CHLORIDE 9 MG/ML
70 INJECTION, SOLUTION INTRAVENOUS CONTINUOUS PRN
Status: CANCELLED | OUTPATIENT
Start: 2017-11-21

## 2017-11-21 NOTE — PATIENT INSTRUCTIONS
1. Avoidance of alcohol. Discussed in length with patient.  2. Antireflux measures: Avoid fried, fatty foods, alcohol, chocolate, coffee, tea,  soft drinks, peppermint and spearmint, spicy foods, tomatoes and tomato based foods, onion based foods, and smoking. Other antireflux measures include weight reduction if overweight, avoiding tight clothing around the abdomen, elevating the head of the bed 6 inches with blocks under the head board, and don't drink or eat before going to bed and avoid lying down immediately after meals.  3. Dexilant 60 mg 1 po at 11 am daily.  4. Zofran 4 mg 1 po every 8 hours as needed for nausea.  5. Low fat diet, exercise, weight loss.  6. Upper endoscopy-EGD: Description of the procedure, risks, benefits, alternatives and options, including nonoperative options, were discussed with the patient in detail. The patient understands and wishes to proceed.  7. Possible colonoscopy in the future.

## 2017-11-21 NOTE — PROGRESS NOTES
"Chief Complaint   Patient presents with   • Abdominal Pain   • Nausea   • Difficulty Swallowing   • Heartburn   • Diarrhea   • Anemia   • Pancreatitis     There is a history of abdominal pain that started in October 2017. The patient had gone to the emergency room and was diagnosed with pancreatitis. The patient was admitted to the hospital. The pain is located in the epigastric area. The pain is occurring daily. The pain is described as moderate. The pain is described as cramping and burning. Eating makes the pain worse. The patient has been taking Dexilant and Zofran with moderate improvement.    The patient started having nausea in October 2017. The patient has nausea daily. She is taking Zofran with moderate improvement. Eating makes the pain worse.     The patient has a long-standing history of heartburn. The patient has heartburn daily. The heartburn can be severe. She has been taking Dexilant with moderate improvement. Heartburn does wake her at night. The patient does eat large quantities of hot sauce on a daily basis. The patient also drinks a significant amount of alcohol daily. She does drink at least 1 pint of vodka daily for the past 20 years. The patient was given a script for Antabuse, but she has not yet started it, as she has not been able to avoid alcohol long enough to start it.     There is a long-standing history of difficulty swallowing. The patient may have difficulty swallowing solids and liquids 3-4 days per week. She feels like \"something\" is stuck in her throat at all times, but some days are worse than others.     The patient has a long-standing history of diarrhea. The patient may have diarrhea 3-4 times per day. Stools are described as watery. It can wake her at night. The patient is not taking anything for the diarrhea. Of interest, when the patient stopped drinking during her hospitalization, she went up to 4-5 days without a bowel movement. When she started drinking alcohol, the " diarrhea returned. There is no history of bright red blood per rectum or melena.     There is a history of anemia for the past 1 year. The patient is not taking anything for the anemia. Of interest, the patient drinks a significant amount of alcohol daily for the past 20 years.    The patient has not had a colonoscopy in the past. There is no family history of colon cancer.    Abdominal Pain   This is a recurrent problem. Episode onset: October 2017. The onset quality is sudden. The problem occurs daily. The problem has been unchanged. The pain is located in the epigastric region. The pain is moderate. The quality of the pain is cramping and burning. The abdominal pain does not radiate. Associated symptoms include arthralgias, constipation, diarrhea, myalgias, nausea and vomiting. Pertinent negatives include no dysuria, fever, headaches, hematochezia, hematuria or melena. The pain is aggravated by eating. The pain is relieved by nothing. Treatments tried: Zofran, Dexilant. The treatment provided moderate relief. Her past medical history is significant for GERD and pancreatitis.   Nausea   This is a recurrent problem. Episode onset: October 2017. The problem occurs daily. The problem has been unchanged. Associated symptoms include abdominal pain, arthralgias, chills, coughing, diaphoresis, myalgias, nausea and vomiting. Pertinent negatives include no chest pain, fatigue, fever, headaches, joint swelling or rash. The symptoms are aggravated by eating. Treatments tried: Zofran. The treatment provided moderate relief.   Difficulty Swallowing   This is a chronic problem. Episode onset: over 3 years. The problem has been unchanged. Associated symptoms include abdominal pain, arthralgias, chills, coughing, diaphoresis, myalgias, nausea and vomiting. Pertinent negatives include no chest pain, fatigue, fever, headaches, joint swelling or rash. The symptoms are aggravated by eating and drinking. She has tried nothing for the  symptoms.   Heartburn   She complains of abdominal pain, coughing, heartburn and nausea. She reports no chest pain. This is a chronic problem. Episode onset: 2001. The problem occurs frequently. The problem has been unchanged. The heartburn duration is an hour. The heartburn is located in the substernum. The heartburn is of severe intensity. Pertinent negatives include no fatigue or melena. Risk factors include ETOH use (The patient eats large quantities of hot sauce daily.). She has tried a PPI for the symptoms. The treatment provided moderate relief.   Diarrhea    This is a chronic problem. Episode onset: over 5 years. The problem occurs 2 to 4 times per day. The problem has been unchanged. The stool consistency is described as watery. Associated symptoms include abdominal pain, arthralgias, chills, coughing, myalgias and vomiting. Pertinent negatives include no fever or headaches. Nothing aggravates the symptoms. There are no known risk factors. She has tried nothing for the symptoms.   Anemia   Presents for initial visit. The condition has lasted for 1 year. Symptoms include abdominal pain, bruises/bleeds easily and light-headedness. There has been no fever or palpitations. Signs of blood loss that are not present include hematemesis, hematochezia, melena and vaginal bleeding. Past treatments include nothing. Past medical history includes alcohol abuse.     Review of Systems   Constitutional: Positive for chills and diaphoresis. Negative for appetite change, fatigue, fever and unexpected weight change.   HENT: Positive for trouble swallowing. Negative for mouth sores and nosebleeds.    Eyes: Negative for discharge and redness.   Respiratory: Positive for cough and shortness of breath. Negative for apnea.    Cardiovascular: Negative for chest pain, palpitations and leg swelling.   Gastrointestinal: Positive for abdominal pain, constipation, diarrhea, heartburn, nausea and vomiting. Negative for abdominal  distention, anal bleeding, blood in stool, hematemesis, hematochezia and melena.   Endocrine: Negative for cold intolerance, heat intolerance and polydipsia.   Genitourinary: Positive for decreased urine volume. Negative for dysuria, hematuria, urgency and vaginal bleeding.   Musculoskeletal: Positive for arthralgias and myalgias. Negative for joint swelling.   Skin: Negative for rash.   Allergic/Immunologic: Negative for food allergies and immunocompromised state.   Neurological: Positive for light-headedness. Negative for dizziness, seizures, syncope and headaches.   Hematological: Negative for adenopathy. Bruises/bleeds easily.   Psychiatric/Behavioral: Negative for dysphoric mood. The patient is nervous/anxious. The patient is not hyperactive.      Patient Active Problem List   Diagnosis   • Anemia   • Ovarian cyst   • Cervical radiculopathy   • Non-intractable vomiting with nausea   • HTN (hypertension)   • COPD (chronic obstructive pulmonary disease)   • Anxiety and depression   • MARSHA (acute kidney injury)   • Alcoholism   • Epigastric pain   • Nausea   • Heartburn   • Dysphagia   • Diarrhea   • Abnormal ultrasound of liver     Past Medical History:   Diagnosis Date   • Acute pancreatitis    • Anemia    • Anemia    • Anxiety    • Asthma    • Blood clot in vein    • COPD (chronic obstructive pulmonary disease)    • Depression    • Frequent headaches    • GERD (gastroesophageal reflux disease)    • History of blood transfusion    • Hypertension    • Iron deficiency    • Kidney calculi    • Kidney disease    • Leg pain    • Migraine     1x/month   • Panic attack    • Reflux esophagitis    • Renal failure ,    • Snores    • Tattoo    • UTI (urinary tract infection)      Past Surgical History:   Procedure Laterality Date   •  SECTION     • FINGER SURGERY      RIGHT PINKY   • HYSTERECTOMY      uterus only   • UPPER GASTROINTESTINAL ENDOSCOPY    "    Family History   Problem Relation Age of Onset   • Hypertension Mother    • Thyroid disease Mother    • Diabetes Father    • Hypertension Father    • Cirrhosis Neg Hx    • Liver disease Neg Hx    • Liver cancer Neg Hx    • Colon cancer Neg Hx    • Crohn's disease Neg Hx    • Ulcerative colitis Neg Hx    • Inflammatory bowel disease Neg Hx    • Esophageal cancer Neg Hx    • Rectal cancer Neg Hx    • Stomach cancer Neg Hx      Social History   Substance Use Topics   • Smoking status: Current Every Day Smoker     Packs/day: 1.00     Types: Cigarettes     Start date: 1985   • Smokeless tobacco: Never Used   • Alcohol use Yes      Comment: now 1 pint/day: \"would drink more if possible\"       Current Outpatient Prescriptions:   •  albuterol (PROAIR RESPICLICK) 108 (90 Base) MCG/ACT inhaler, Inhale 2 puffs Every 4 (Four) Hours As Needed for Wheezing., Disp: , Rfl:   •  albuterol (PROVENTIL HFA;VENTOLIN HFA) 108 (90 BASE) MCG/ACT inhaler, Inhale 2 puffs Daily., Disp: , Rfl:   •  Aspirin-Acetaminophen-Caffeine (EXCEDRIN MIGRAINE PO), Take  by mouth., Disp: , Rfl:   •  budesonide-formoterol (SYMBICORT) 160-4.5 MCG/ACT inhaler, Inhale 2 puffs 2 (Two) Times a Day., Disp: , Rfl:   •  dexlansoprazole (DEXILANT) 60 MG capsule, Take 60 mg by mouth Daily., Disp: , Rfl:   •  disulfiram (ANTABUSE) 500 MG tablet tablet, Take 500 mg by mouth Daily., Disp: , Rfl:   •  hydrochlorothiazide (HYDRODIURIL) 12.5 MG tablet, Take 12.5 mg by mouth Every Morning., Disp: , Rfl:   •  lisinopril (PRINIVIL,ZESTRIL) 5 MG tablet, Take 5 mg by mouth Daily., Disp: , Rfl:   •  O2 (OXYGEN), Inhale 2 L/min Every Night., Disp: , Rfl:   •  pantoprazole (PROTONIX) 40 MG EC tablet, Take 40 mg by mouth Daily., Disp: , Rfl:   •  buPROPion XL (WELLBUTRIN XL) 150 MG 24 hr tablet, Take 1 tablet by mouth Daily., Disp: , Rfl: 0  •  diazePAM (VALIUM) 5 MG tablet, Take 1 tablet by mouth 2 (Two) Times a Day As Needed., Disp: , Rfl: 0  •  ondansetron (ZOFRAN) 8 MG " "tablet, Take 1 tablet by mouth Every 8 (Eight) Hours., Disp: , Rfl: 0    No Known Allergies    /79  Pulse 98  Temp 98.2 °F (36.8 °C)  Resp 16  Ht 66\" (167.6 cm)  Wt 184 lb (83.5 kg)  LMP  (LMP Unknown)  BMI 29.7 kg/m2    Physical Exam   Constitutional: She is oriented to person, place, and time. She appears well-developed and well-nourished. No distress.   HENT:   Head: Normocephalic and atraumatic.   Right Ear: Hearing and external ear normal.   Left Ear: Hearing and external ear normal.   Nose: Nose normal.   Mouth/Throat: Oropharynx is clear and moist and mucous membranes are normal. Mucous membranes are not pale, not dry and not cyanotic. No oral lesions. No oropharyngeal exudate.   Eyes: Conjunctivae and EOM are normal. Right eye exhibits no discharge. Left eye exhibits no discharge.   Neck: Trachea normal. Neck supple. No JVD present. No edema present. No thyroid mass and no thyromegaly present.   Cardiovascular: Normal rate, regular rhythm, S2 normal and normal heart sounds.  Exam reveals no gallop, no S3 and no friction rub.    No murmur heard.  Pulmonary/Chest: Effort normal and breath sounds normal. No respiratory distress. She exhibits no tenderness.   Abdominal: Normal appearance and bowel sounds are normal. She exhibits no distension, no ascites and no mass. There is no splenomegaly or hepatomegaly. There is tenderness (severe) in the epigastric area. There is no rigidity, no rebound and no guarding. No hernia.       Vascular Status -  Her exam exhibits no right foot edema. Her exam exhibits no left foot edema.  Lymphadenopathy:     She has no cervical adenopathy.        Left: No supraclavicular adenopathy present.   Neurological: She is alert and oriented to person, place, and time. She has normal strength. No cranial nerve deficit or sensory deficit.   Skin: No rash noted. She is not diaphoretic. No cyanosis. No pallor. Nails show no clubbing.   Psychiatric: She has a normal mood and " affect.   Nursing note and vitals reviewed.  Stigmata of chronic liver disease:  None.  Asterixis:  None.    Laboratory Results:  Upon review of records:    Dated 10/5/2017 glucose 168 BUN 24 creatinine 2.0 sodium 135 potassium 5.2 chloride 99 CO2 6.0 calcium 11.0 albumin 5.6 ALT 31 AST 34 upon phosphatase 90 total bilirubin 0.9 WBC 19.35 hemoglobin 15.1 hematocrit 44.3 platelet count 261 MCV 94.5 ethanol <10 lipase 404 lactate 1.0    Dated 10/6/2017 glucose 100 BUN 10 creatinine 0.8 sodium 145 potassium 3.4 chloride 109 CO2 20 calcium 9.8 albumin 4.4 ALT 35 AST 30 1:00 phosphatase 59 total bilirubin 0.5 WBC 6.14 hemoglobin 12.2 hematocrit 35.9 platelet count 163 MCV 96.0 lipase 236 magnesium 2.0    Dated 10/11/2017 glucose 99 BUN 11 creatinine 0.7 sodium 142 potassium 4.2 chloride 105 CO2 26 calcium 9.5 albumin 4.2 ALT 42 AST 29 alkaline phosphatase 56 total bilirubin 0.3 WBC 8.89 hemoglobin 11.2 hematocrit 33.5 platelet count 194 MCV 98.0 8/1/43    Abdominal Imaging:  Upon review of records:    CT of abdomen and pelvis without contrast dated 10/5/2017 reveals Lack of intravenous contrast limits evaluation of the solid organs, the mediastinum, and the vasculature. There is no  nephrolithiasis. There is no hydronephrosis. The limited noncontrast images of the liver are normal. The gallbladder is unremarkable. The spleen is normal. No adrenal masses are seen.  The pancreas has an unremarkable unenhanced appearance.. The aorta is normal in caliber.There is no significant free fluid or adenopathy.  Limited noncontrast images of the bowel are unremarkable. Nodular density within the lateral lingula measuring up to 8 mm, this may be secondary to atelectasis. Follow-up chest CT in 3 months is recommended to prior stability. The appendix is normal. The urinary bladder is unremarkable. There is no significant fluid or adenopathy. The osseous structures are intact. . The uterus is surgically absent.    Ultrasound of  gallbladder dated 10/5/2017 reveals Limited images of the pancreas are unremarkable.  The liver parenchyma is fatty infiltrated.  The gallbladder is normal with no shadowing stones or wall thickening.  There is no pericholecystic fluid.  The common duct measures 4.5 mm.  Limited images of the right kidney are unremarkable.    Notes:  1. History of pancreatitis in August 2016 and October 2017.          Assessment and Plan:    Agatha was seen today for abdominal pain, nausea, difficulty swallowing, heartburn, diarrhea, anemia and pancreatitis.    Diagnoses and all orders for this visit:    Epigastric pain  Comments:  Differentials include peptic ulcer disease, pancreatobiliary disease.  Of interest, patient was [facility] to [facility]/2017 for pancreatitis.    Nausea  Comments:  Differentials include peptic ulcer disease, pancreatobiliary disease.    Heartburn  Comments:  History of long-standing heartburn.  Not controlled with Dexilant.  Concerns for underlying Campbell's.  Of interest, patient drinks 1 pint of vodka daily.    Dysphagia, unspecified type  Comments:  Differentials include Schatzki's ring, esophagitis, esophageal dysmotility.    Diarrhea, unspecified type  Comments:  Differentials include alcohol abuse, microscopic colitis, underlying inflammatory bowel disease.    Anemia, unspecified type  Comments:  Long-standing history of anemia.  Of interest, patient has a long-standing history of alcohol abuse.    Abnormal ultrasound of liver  Comments:  Fatty infiltration of liver per ultrasound. Of interest, LFT's are normal.        Plan  and Patient Instructions:  Patient Instructions   1. Avoid alcohol. Discussed in length with patient.  2. Antireflux measures: Avoid fried, fatty foods, alcohol, chocolate, coffee, tea,  soft drinks, peppermint and spearmint, spicy foods, tomatoes and tomato based foods, onion based foods, and smoking. Other antireflux measures include weight reduction if overweight, avoiding  tight clothing around the abdomen, elevating the head of the bed 6 inches with blocks under the head board, and don't drink or eat before going to bed and avoid lying down immediately after meals.  3. Dexilant 60 mg 1 po at 11 am daily.  4. Zofran 4 mg 1 po every 8 hours as needed for nausea.  5. Low fat diet, exercise, weight loss.  6. Upper endoscopy-EGD: Description of the procedure, risks, benefits, alternatives and options, including nonoperative options, were discussed with the patient in detail. The patient understands and wishes to proceed.  7. Possible colonoscopy in the future.    Vinny Mares, APRN

## 2018-01-02 RX ORDER — QUETIAPINE FUMARATE 50 MG/1
50 TABLET, FILM COATED ORAL NIGHTLY PRN
COMMUNITY

## 2018-01-09 ENCOUNTER — HOSPITAL ENCOUNTER (OUTPATIENT)
Facility: HOSPITAL | Age: 43
Setting detail: HOSPITAL OUTPATIENT SURGERY
Discharge: HOME | End: 2018-01-09
Attending: INTERNAL MEDICINE | Admitting: INTERNAL MEDICINE

## 2018-01-09 ENCOUNTER — ANESTHESIA EVENT (OUTPATIENT)
Dept: GASTROENTEROLOGY | Facility: HOSPITAL | Age: 43
End: 2018-01-09

## 2018-01-09 ENCOUNTER — ANESTHESIA (OUTPATIENT)
Dept: GASTROENTEROLOGY | Facility: HOSPITAL | Age: 43
End: 2018-01-09

## 2018-01-09 VITALS
HEIGHT: 66 IN | WEIGHT: 190 LBS | BODY MASS INDEX: 30.53 KG/M2 | OXYGEN SATURATION: 98 % | SYSTOLIC BLOOD PRESSURE: 122 MMHG | DIASTOLIC BLOOD PRESSURE: 78 MMHG | RESPIRATION RATE: 20 BRPM | TEMPERATURE: 98 F | HEART RATE: 80 BPM

## 2018-01-09 DIAGNOSIS — R12 HEARTBURN: ICD-10-CM

## 2018-01-09 DIAGNOSIS — R10.13 EPIGASTRIC PAIN: ICD-10-CM

## 2018-01-09 DIAGNOSIS — R11.0 NAUSEA: ICD-10-CM

## 2018-01-09 DIAGNOSIS — R13.10 DYSPHAGIA, UNSPECIFIED TYPE: ICD-10-CM

## 2018-01-09 PROCEDURE — 25010000002 ONDANSETRON PER 1 MG: Performed by: NURSE ANESTHETIST, CERTIFIED REGISTERED

## 2018-01-09 PROCEDURE — 25010000002 PROPOFOL 200 MG/20ML EMULSION: Performed by: NURSE ANESTHETIST, CERTIFIED REGISTERED

## 2018-01-09 PROCEDURE — 43248 EGD GUIDE WIRE INSERTION: CPT | Performed by: INTERNAL MEDICINE

## 2018-01-09 PROCEDURE — S0260 H&P FOR SURGERY: HCPCS | Performed by: INTERNAL MEDICINE

## 2018-01-09 PROCEDURE — 43239 EGD BIOPSY SINGLE/MULTIPLE: CPT | Performed by: INTERNAL MEDICINE

## 2018-01-09 RX ORDER — ONDANSETRON 2 MG/ML
INJECTION INTRAMUSCULAR; INTRAVENOUS AS NEEDED
Status: DISCONTINUED | OUTPATIENT
Start: 2018-01-09 | End: 2018-01-09 | Stop reason: SURG

## 2018-01-09 RX ORDER — SODIUM CHLORIDE 9 MG/ML
70 INJECTION, SOLUTION INTRAVENOUS CONTINUOUS PRN
Status: DISCONTINUED | OUTPATIENT
Start: 2018-01-09 | End: 2018-01-09 | Stop reason: HOSPADM

## 2018-01-09 RX ORDER — PROPOFOL 10 MG/ML
INJECTION, EMULSION INTRAVENOUS AS NEEDED
Status: DISCONTINUED | OUTPATIENT
Start: 2018-01-09 | End: 2018-01-09 | Stop reason: SURG

## 2018-01-09 RX ADMIN — PROPOFOL 50 MG: 10 INJECTION, EMULSION INTRAVENOUS at 08:05

## 2018-01-09 RX ADMIN — LIDOCAINE HYDROCHLORIDE 60 MG: 20 INJECTION, SOLUTION INTRAVENOUS at 07:49

## 2018-01-09 RX ADMIN — PROPOFOL 50 MG: 10 INJECTION, EMULSION INTRAVENOUS at 08:10

## 2018-01-09 RX ADMIN — PROPOFOL 150 MG: 10 INJECTION, EMULSION INTRAVENOUS at 07:49

## 2018-01-09 RX ADMIN — PROPOFOL 50 MG: 10 INJECTION, EMULSION INTRAVENOUS at 08:00

## 2018-01-09 RX ADMIN — ONDANSETRON 4 MG: 2 INJECTION INTRAMUSCULAR; INTRAVENOUS at 07:49

## 2018-01-09 RX ADMIN — SODIUM CHLORIDE 70 ML/HR: 9 INJECTION, SOLUTION INTRAVENOUS at 06:39

## 2018-01-09 RX ADMIN — PROPOFOL 50 MG: 10 INJECTION, EMULSION INTRAVENOUS at 07:53

## 2018-01-09 NOTE — ANESTHESIA PREPROCEDURE EVALUATION
Anesthesia Evaluation     Patient summary reviewed and Nursing notes reviewed   no history of anesthetic complications:  NPO Solid Status: > 8 hours  NPO Liquid Status: > 8 hours     Airway   Mallampati: I  TM distance: >3 FB  Neck ROM: full  no difficulty expected  Dental - normal exam     Pulmonary - normal exam   (+) a smoker Current Smoked day of surgery, COPD, asthma,   Cardiovascular - normal exam    (+) hypertension,       Neuro/Psych- negative ROS  GI/Hepatic/Renal/Endo    (+)  GERD well controlled,     Musculoskeletal (-) negative ROS    Abdominal    Substance History   (+) alcohol use,       Comment: etoh abuse 5 weeks sober   OB/GYN negative ob/gyn ROS         Other - negative ROS                                             Anesthesia Plan    ASA 3     MAC     intravenous induction   Anesthetic plan and risks discussed with patient.

## 2018-01-09 NOTE — PLAN OF CARE
Problem: GI Endoscopy (Adult)  Goal: Signs and Symptoms of Listed Potential Problems Will be Absent or Manageable (GI Endoscopy)  Outcome: Ongoing (interventions implemented as appropriate)   01/09/18 0623   GI Endoscopy   Problems Assessed (GI Endoscopy) all   Problems Present (GI Endoscopy) none

## 2018-01-09 NOTE — OP NOTE
PROCEDURE:  Upper Endoscopy with wire-guided serial dilation of the proximal esophagus using 14-15-16 mm savory dilators to 16 mm and biopsies.    DATE OF PROCEDURE: January 9, 2018.    REFERRING PROVIDER:  Kenneth Garcia PA-C.     INSTRUMENT:  Olympus GIF H 190 video endoscope     INDICATIONS OF THE PROCEDURE:   This is a 42-year-old white female with history of recurrent reflux, nausea, epigastric abdominal pain and dysphagia. Currently undergoing upper endoscopy for further evaluation.     BIOPSIES: Second portion of duodenum.  Gastric antrum, angularis and body of the stomach. Mid and distal esophagus.      MEDICATIONS:  MAC.     PHOTOGRAPHS:  Photographs were included in the medical records.     CONSENT/PREPROCEDURE EVALUATION:  Risks, benefits, alternatives and options of the procedure including risks of anesthesia/sedation were discussed and informed consent was obtained prior to the procedure. History and physical examination were performed and nothing precluded the test.     REPORT:  The patient was placed in left lateral decubitus position. Once under the influence of IV sedation, the instrument was inserted into the mouth and esophagus was intubated under direct vision without difficulty.     Esophagus: Upper esophageal sphincter was noted to be somewhat tortuous. Additionally stenosis was noted in the proximal esophagus. Subtle concentric rings were seen within the mid and distal esophagus. Biopsies were obtained. Mild erythematous distal esophagitis was seen. Z line was noted to be around  36 cm.    A small sliding hiatal hernia less than 3 cm was noted.  No Campbell's esophagus was seen.     Stomach:  Antrum:  Erythematous gastritis. Antral deformity was seen. Angulus, lesser and greater curves: Normal.  Retroflex examination: Sliding hiatal hernia.  Cardia and fundus:  Normal.     Body of the stomach: Erythematous gastritis.  Good distensibility of the stomach was achieved no giant folds were noted.    Biopsies were obtained from the gastric antrum, angularis and body of the stomach.    Pylorus and pyloric channel:  normal.     Duodenum:  Bulb: Normal.  Second portion: normal.  No scalloping was seen in the second portion of duodenum.  Biopsies were obtained from the second portion of duodenum.    Intervention:  Savory wire was placed deep in the upper GI tract under vision.  The scope was then removed from the patient.  Proximal esophagus was dilated using 14, 15 and finally 16 mm savory dilators over the wire without difficulty.  Small amount of blood was noted.  The scope was reintroduced along the wire and esophagus was intubated under direct vision without difficulty.  Proximal esophagus was examined.  No active bleeding was seen.  The scope was then advanced into the stomach.  The wire was discontinued. No additional pathology was seen.       The upper GI tract was decompressed and the scope was pulled out of the patient. The patient tolerated the procedure well.     DIAGNOSES:     1. Proximal esophageal stenosis. Status post wire-guided serial dilation to 16 mm using savory dilators.  2. Erythematous distal esophagitis. No Campbell's esophagus.  3. Small sliding hiatal hernia less than 3 cm.  4. Erythematous gastritis with evidence of old healed ulceration.  5. Subtle concentric rings were noted within the mid and distal esophagus. Biopsies were obtained.      RECOMMENDATIONS:  1.  Dietary instructions.  2.  Pantoprazole 40 mg 1 p.o. q.a.m. 1/2 hour before breakfast.  3.  Follow biopsies.  4.  Follow up in office.  5.  Zofran 4 mg tablet.  1-to 3 times a day by mouth as needed for nausea.       Thank you very much for letting me participate in the care of this patient. Please do not hesitate to call me if you have any questions.

## 2018-01-09 NOTE — DISCHARGE INSTRUCTIONS
Postprocedure instructions:  Nothing by mouth until fully alert.  Bedrest until fully alert.  Vital signs as routine.    Diet:   Nothing by mouth for 30 minutes, then if no chest pains the patient may have Clear liquids diet (No Sodas) for 2 hours.  May advance to soft diet in 2 hours if no chest pains, Fever or chills, nausea vomiting or bleeding.    The patient may eat in upright position, chew well, take small bites and take medications in upright position.   The patient should drink water after 3-4 bites, and liberally with medications.   The patient should remain upright for about 10 minutes after eating and taking oral medications.      Blood Thinner and other medications Directions:  Avoid Aspirin & other NSAIDS.  Tylenol is okay.    Other instructions:  The patient should avoid medications that have a potential to cause pill esophagitis including potassium pills, tetracycline capsules, iron pills, NSAIDs and bisphosphonates.      Follow-up:    DR. SHAILA RIVAS in 4 weeks.Office phone # [phone].

## 2018-01-09 NOTE — H&P
Chief complaint:  Abdominal Pain, Nausea, Vomiting, Heartburn, Dysphagia, Constipation, Diarrhea, Anemia    History of present illness:     There is no history of:  BH Change, Hematemesis, Melena, or BRBPR.    Past medical history:   Past Medical History:   Diagnosis Date   • Acute pancreatitis     X2   • Anemia    • Anemia    • Anxiety    • Asthma    • COPD (chronic obstructive pulmonary disease)    • Depression    • Frequent headaches    • GERD (gastroesophageal reflux disease)    • H/O exercise stress test    • History of blood transfusion    • Hypertension    • Iron deficiency    • Kidney calculi    • Leg pain    • Migraine     1x/month   • Panic attack    • Reflux esophagitis    • Renal failure     ACUTE   • Snores    • Tattoo    • UTI (urinary tract infection)    • Wears glasses        Surgical history:    Past Surgical History:   Procedure Laterality Date   •  SECTION     • FINGER SURGERY      RIGHT PINKY   • HYSTERECTOMY      uterus only   • UPPER GASTROINTESTINAL ENDOSCOPY         Social history:   ETOH: Yes  Tobacco Use:  Yes  Other Notes:    Allergies:  Review of patient's allergies indicates no known allergies.    Latex allergy: None  Contrast allergy: None    Medications:  Prescriptions Prior to Admission   Medication Sig Dispense Refill Last Dose   • albuterol (PROAIR RESPICLICK) 108 (90 Base) MCG/ACT inhaler Inhale 2 puffs Every 4 (Four) Hours As Needed for Wheezing.   2018 at 1500   • Aspirin-Acetaminophen-Caffeine (EXCEDRIN MIGRAINE PO) Take 1 tablet by mouth Every 6 (Six) Hours As Needed.   2018 at 0600   • budesonide-formoterol (SYMBICORT) 160-4.5 MCG/ACT inhaler Inhale 2 puffs 2 (Two) Times a Day.   2018 at 1800   • buPROPion XL (WELLBUTRIN XL) 150 MG 24 hr tablet Take 1 tablet by mouth Daily.  0 2018 at 0600   • dexlansoprazole (DEXILANT) 60 MG capsule Take 60 mg by mouth Daily.   2018 at 0600   • diazePAM  "(VALIUM) 5 MG tablet Take 1 tablet by mouth 2 (Two) Times a Day As Needed.  0 1/8/2018 at 1800   • disulfiram (ANTABUSE) 500 MG tablet tablet Take 500 mg by mouth Daily.   1/8/2018 at 0600   • hydrochlorothiazide (HYDRODIURIL) 12.5 MG tablet Take 12.5 mg by mouth Daily As Needed.   1/7/2018 at 0600   • lisinopril (PRINIVIL,ZESTRIL) 5 MG tablet Take 5 mg by mouth Daily.   1/8/2018 at 0600   • O2 (OXYGEN) Inhale 2 L/min Every Night.   1/8/2018 at 2200   • ondansetron (ZOFRAN) 8 MG tablet Take 1 tablet by mouth Every 8 (Eight) Hours.  0 Past Month at Unknown time   • pantoprazole (PROTONIX) 40 MG EC tablet Take 40 mg by mouth As Needed. \"I TAKE IT IF I DON'T HAVE THE DEXILANT\"   1/8/2018 at 0600   • QUEtiapine (SEROquel) 50 MG tablet Take 50 mg by mouth At Night As Needed.   Past Month at Unknown time       Review of systems:   Constitutional: No recent:  Fever, Weight loss or Night sweats, no Glaucoma.  Respiratory: No recent: Hemoptysis or Sputum. No WILLAM.  Cardiovascular: No Recent: Chest Pains, Orthopnea, PND, Palpitations or MI.     No history of: CAD, MI, CHF, VHD, RHD, PVD, or Arrhythmia.  Endocrine: No history of: DM, Hypothyroidism or Hyperthyroidism.  Genitourinary: No history of: Recent UTI.  Musculoskeletal: No history of: OA, RA, Gout, SLE or Fibromyalgia.  Neurological: No history of: Dementia, RLS, Recent Seizures, CVA, TIA.   Hem. Oncology: No history of: Known Cancer.    VITAL SIGNS:    Blood pressure 124/80, pulse 104, temperature 97.2 °F (36.2 °C), temperature source Temporal Artery , resp. rate 18, height 167.6 cm (66\"), weight 86.2 kg (190 lb), SpO2 97 %.    PHYSICAL EXAMINATION:   HEENT: Normal.   Lungs: Clear to auscultation.  Heart: No S3, no murmur.    Abdomen: Soft.  BS+ ND, NT  Extremities: No edema.  No cyanosis.  Neuro: Alert X 3. No focal deficit.    Assessment: Abdominal Pain, Nausea, Vomiting, Heartburn, Dysphagia, Constipation, Diarrhea, Anemia    Plan:   Upper " Endoscopy    Risks/Benefits:  The potential benefits, risk and/or side effects of the procedure and alternatives have been discussed with the patient/authorized representative and questions were answered.

## 2018-01-09 NOTE — ANESTHESIA POSTPROCEDURE EVALUATION
Patient: Agatha Avendano    Procedure Summary     Date Anesthesia Start Anesthesia Stop Room / Location    01/09/18 0746 0814 [facility] ENDOSCOPY 2 / [facility] ENDOSCOPY       Procedure Diagnosis Surgeon Provider    ESOPHAGOGASTRODUODENOSCOPY with biopsies with savory wire guided esophageal dilitation  (N/A Esophagus) Esophageal stenosis; Esophagitis; Hiatal hernia  (Heartburn [R12]; Nausea [R11.0]; Epigastric pain [R10.13]; Dysphagia, unspecified type [R13.10]) MD David Rangel CRNA          Anesthesia Type: MAC  Last vitals  BP   122/78 (01/09/18 0856)   Temp   98 °F (36.7 °C) (01/09/18 0826)   Pulse   80 (01/09/18 0856)   Resp   20 (01/09/18 0856)     SpO2   98 % (01/09/18 0856)     Post Anesthesia Care and Evaluation    Patient location during evaluation: bedside  Patient participation: complete - patient participated  Level of consciousness: awake  Pain score: 0  Pain management: adequate  Airway patency: patent  Anesthetic complications: No anesthetic complications  PONV Status: controlled  Cardiovascular status: acceptable and stable  Respiratory status: acceptable and room air  Hydration status: acceptable

## 2018-01-14 LAB
LAB AP CASE REPORT: NORMAL
Lab: NORMAL
PATH REPORT.FINAL DX SPEC: NORMAL

## 2018-01-25 ENCOUNTER — LAB (OUTPATIENT)
Dept: LAB | Facility: HOSPITAL | Age: 43
End: 2018-01-25

## 2018-01-25 DIAGNOSIS — M25.474 BILATERAL SWELLING OF FEET AND ANKLES: ICD-10-CM

## 2018-01-25 DIAGNOSIS — M25.472 BILATERAL SWELLING OF FEET AND ANKLES: ICD-10-CM

## 2018-01-25 DIAGNOSIS — M25.475 BILATERAL SWELLING OF FEET AND ANKLES: ICD-10-CM

## 2018-01-25 DIAGNOSIS — R20.8 DECREASED SENSATION OF HAND AND UPPER EXTREMITY: ICD-10-CM

## 2018-01-25 DIAGNOSIS — Z87.19 HISTORY OF ACUTE PANCREATITIS: ICD-10-CM

## 2018-01-25 DIAGNOSIS — M25.471 BILATERAL SWELLING OF FEET AND ANKLES: ICD-10-CM

## 2018-01-25 LAB
ALBUMIN SERPL-MCNC: 4.4 G/DL (ref 3.5–5)
ALBUMIN/GLOB SERPL: 1.5 G/DL (ref 1–2)
ALP SERPL-CCNC: 55 U/L (ref 38–126)
ALT SERPL W P-5'-P-CCNC: 26 U/L (ref 13–69)
ANION GAP SERPL CALCULATED.3IONS-SCNC: 15.2 MMOL/L
AST SERPL-CCNC: 18 U/L (ref 15–46)
BASOPHILS # BLD AUTO: 0.02 10*3/MM3 (ref 0–0.2)
BASOPHILS NFR BLD AUTO: 0.3 % (ref 0–2.5)
BILIRUB SERPL-MCNC: 0.4 MG/DL (ref 0.2–1.3)
BUN BLD-MCNC: 6 MG/DL (ref 7–20)
BUN/CREAT SERPL: 8.6 (ref 7.1–23.5)
CALCIUM SPEC-SCNC: 9.5 MG/DL (ref 8.4–10.2)
CHLORIDE SERPL-SCNC: 109 MMOL/L (ref 98–107)
CO2 SERPL-SCNC: 22 MMOL/L (ref 26–30)
CREAT BLD-MCNC: 0.7 MG/DL (ref 0.6–1.3)
DEPRECATED RDW RBC AUTO: 42.7 FL (ref 37–54)
EOSINOPHIL # BLD AUTO: 0.13 10*3/MM3 (ref 0–0.7)
EOSINOPHIL NFR BLD AUTO: 1.7 % (ref 0–7)
ERYTHROCYTE [DISTWIDTH] IN BLOOD BY AUTOMATED COUNT: 13 % (ref 11.5–14.5)
GFR SERPL CREATININE-BSD FRML MDRD: 91 ML/MIN/1.73
GLOBULIN UR ELPH-MCNC: 2.9 GM/DL
GLUCOSE BLD-MCNC: 78 MG/DL (ref 74–98)
HCT VFR BLD AUTO: 35.9 % (ref 37–47)
HGB BLD-MCNC: 12.2 G/DL (ref 12–16)
IMM GRANULOCYTES # BLD: 0.04 10*3/MM3 (ref 0–0.06)
IMM GRANULOCYTES NFR BLD: 0.5 % (ref 0–0.6)
LYMPHOCYTES # BLD AUTO: 2.62 10*3/MM3 (ref 0.6–3.4)
LYMPHOCYTES NFR BLD AUTO: 34 % (ref 10–50)
MCH RBC QN AUTO: 31.1 PG (ref 27–31)
MCHC RBC AUTO-ENTMCNC: 34 G/DL (ref 30–37)
MCV RBC AUTO: 91.6 FL (ref 81–99)
MONOCYTES # BLD AUTO: 0.52 10*3/MM3 (ref 0–0.9)
MONOCYTES NFR BLD AUTO: 6.8 % (ref 0–12)
NEUTROPHILS # BLD AUTO: 4.37 10*3/MM3 (ref 2–6.9)
NEUTROPHILS NFR BLD AUTO: 56.7 % (ref 37–80)
NRBC BLD MANUAL-RTO: 0 /100 WBC (ref 0–0)
PLATELET # BLD AUTO: 189 10*3/MM3 (ref 130–400)
PMV BLD AUTO: 11.2 FL (ref 6–12)
POTASSIUM BLD-SCNC: 3.2 MMOL/L (ref 3.5–5.1)
PROT SERPL-MCNC: 7.3 G/DL (ref 6.3–8.2)
RBC # BLD AUTO: 3.92 10*6/MM3 (ref 4.2–5.4)
SODIUM BLD-SCNC: 143 MMOL/L (ref 137–145)
T4 FREE SERPL-MCNC: 0.86 NG/DL (ref 0.78–2.19)
TSH SERPL DL<=0.05 MIU/L-ACNC: 2.27 MIU/ML (ref 0.47–4.68)
WBC NRBC COR # BLD: 7.7 10*3/MM3 (ref 4.8–10.8)

## 2018-01-25 PROCEDURE — 84481 FREE ASSAY (FT-3): CPT

## 2018-01-25 PROCEDURE — 36415 COLL VENOUS BLD VENIPUNCTURE: CPT

## 2018-01-25 PROCEDURE — 84439 ASSAY OF FREE THYROXINE: CPT

## 2018-01-25 PROCEDURE — 80050 GENERAL HEALTH PANEL: CPT

## 2018-01-26 LAB — T3FREE SERPL-MCNC: 2.4 PG/ML (ref 2–4.4)

## 2018-01-31 ENCOUNTER — OFFICE VISIT (OUTPATIENT)
Dept: GASTROENTEROLOGY | Facility: CLINIC | Age: 43
End: 2018-01-31

## 2018-01-31 ENCOUNTER — PREP FOR SURGERY (OUTPATIENT)
Dept: OTHER | Facility: HOSPITAL | Age: 43
End: 2018-01-31

## 2018-01-31 VITALS
WEIGHT: 177 LBS | HEIGHT: 66 IN | HEART RATE: 91 BPM | SYSTOLIC BLOOD PRESSURE: 105 MMHG | BODY MASS INDEX: 28.45 KG/M2 | RESPIRATION RATE: 15 BRPM | DIASTOLIC BLOOD PRESSURE: 79 MMHG | TEMPERATURE: 97.3 F

## 2018-01-31 DIAGNOSIS — Z12.11 COLON CANCER SCREENING: Primary | ICD-10-CM

## 2018-01-31 DIAGNOSIS — K29.50 CHRONIC GASTRITIS WITHOUT BLEEDING, UNSPECIFIED GASTRITIS TYPE: Chronic | ICD-10-CM

## 2018-01-31 DIAGNOSIS — D64.9 ANEMIA, UNSPECIFIED TYPE: Chronic | ICD-10-CM

## 2018-01-31 DIAGNOSIS — K22.2 ESOPHAGEAL STENOSIS: ICD-10-CM

## 2018-01-31 DIAGNOSIS — R12 HEARTBURN: Primary | Chronic | ICD-10-CM

## 2018-01-31 DIAGNOSIS — R19.7 DIARRHEA, UNSPECIFIED TYPE: Chronic | ICD-10-CM

## 2018-01-31 DIAGNOSIS — D64.9 ANEMIA, UNSPECIFIED TYPE: ICD-10-CM

## 2018-01-31 DIAGNOSIS — R11.0 NAUSEA: Chronic | ICD-10-CM

## 2018-01-31 DIAGNOSIS — K20.90 ESOPHAGITIS: ICD-10-CM

## 2018-01-31 DIAGNOSIS — R13.10 DYSPHAGIA, UNSPECIFIED TYPE: Chronic | ICD-10-CM

## 2018-01-31 DIAGNOSIS — R19.7 DIARRHEA, UNSPECIFIED TYPE: ICD-10-CM

## 2018-01-31 DIAGNOSIS — R93.2 ABNORMAL ULTRASOUND OF LIVER: Chronic | ICD-10-CM

## 2018-01-31 DIAGNOSIS — R10.13 EPIGASTRIC PAIN: Chronic | ICD-10-CM

## 2018-01-31 PROCEDURE — 99214 OFFICE O/P EST MOD 30 MIN: CPT | Performed by: NURSE PRACTITIONER

## 2018-01-31 RX ORDER — SODIUM CHLORIDE 9 MG/ML
70 INJECTION, SOLUTION INTRAVENOUS CONTINUOUS PRN
Status: CANCELLED | OUTPATIENT
Start: 2018-01-31

## 2018-01-31 RX ORDER — DEXLANSOPRAZOLE 60 MG/1
CAPSULE, DELAYED RELEASE ORAL
Qty: 30 CAPSULE | Refills: 5 | Status: SHIPPED | OUTPATIENT
Start: 2018-01-31 | End: 2018-02-02

## 2018-01-31 NOTE — PATIENT INSTRUCTIONS
1. Antireflux measures: Avoid fried, fatty foods, alcohol, chocolate, coffee, tea,  soft drinks, peppermint and spearmint, spicy foods, tomatoes and tomato based foods, onion based foods, and smoking. Other antireflux measures include weight reduction if overweight, avoiding tight clothing around the abdomen, elevating the head of the bed 6 inches with blocks under the head board, and don't drink or eat before going to bed and avoid lying down immediately after meals.  2. Dexilant 60 mg 1 po daily at 11 am.  3. Discontinue Pantoprazole.  4. Zofran 4 mg 1 po every 8 hours as needed for Zofran.  5. Continue to avoid alcohol.  6. Low fat diet, exercise, weight loss.  7. Colonoscopy: Description of the procedure, risks, benefits, alternatives and options, including nonoperative options, were discussed with the patient in detail. The patient understands and wishes to proceed.

## 2018-01-31 NOTE — PROGRESS NOTES
"Chief Complaint   Patient presents with   • Follow-up   • Heartburn     There is a history of abdominal pain that started in October 2017. The patient has had significant improvement with epigastric pain. The pain is located in the epigastric area. The pain is occurring rarely. The pain is described as moderate. The pain is described as cramping and burning. Eating makes the pain worse.      The patient started having nausea in October 2017. The patient had nausea daily, but recently, she has not had any nausea. The patient stopped drinking alcohol 2 months ago and she has felt significantly better. Nausea has been improving. She takes Zofran as needed with significant improvement, but she has not needed to take the Zofran recently.     The patient has a long-standing history of heartburn. The patient has heartburn daily. Heartburn seems to be worsening since stopping Dexilant, due to insurance declining to pay for it. The heartburn can be severe. She has been taking Pantoprazole with no improvement. Heartburn does wake her at night. The patient has stopped eating hot sauce. The patient stopped all alcohol 2 months ago. She did drink at least 1 pint of vodka daily for the past 20 years. The patient has tried Lansoprazole, Nexium, Ranitidine, and Pepcid in the past, but this was not effective for the heartburn.     There is a long-standing history of difficulty swallowing. The patient may have difficulty swallowing solids and liquids 3-4 days per month. Difficulty swallowing has improved. She feels like \"something\" is stuck in her throat at all times, but some days are worse than others.      The patient has a long-standing history of diarrhea. The diarrhea has resolved since stopping alcohol use. She is having 1 soft stool daily. There is no history of bright red blood per rectum or melena.      There is a history of anemia for the past 1 year. The patient is not taking anything for the anemia. Of interest, the " patient drank a significant amount of alcohol daily for the past 20 years.     The patient has not had a colonoscopy in the past. There is no family history of colon cancer.    Heartburn   She complains of coughing. She reports no abdominal pain, no chest pain or no nausea. This is a chronic problem. Episode onset: 2001. The problem occurs frequently. The problem has been gradually worsening. The heartburn duration is an hour. The heartburn is located in the substernum. The heartburn is of severe intensity. Pertinent negatives include no fatigue or melena. Risk factors include ETOH use (The patient eats large quantities of hot sauce daily.). She has tried a PPI for the symptoms. The treatment provided no relief. Past procedures include an EGD (1/2018).   Abdominal Pain   This is a recurrent problem. Episode onset: October 2017. The onset quality is sudden. The problem occurs rarely. The problem has been rapidly improving. The pain is located in the epigastric region. The pain is moderate. The quality of the pain is cramping and burning. The abdominal pain does not radiate. Associated symptoms include headaches. Pertinent negatives include no arthralgias, constipation, diarrhea, dysuria, fever, hematochezia, hematuria, melena, myalgias, nausea or vomiting. The pain is aggravated by eating. The pain is relieved by nothing. She has tried proton pump inhibitors for the symptoms. The treatment provided significant relief. Her past medical history is significant for GERD and pancreatitis.   Nausea   This is a recurrent problem. Episode onset: October 2017. The problem occurs rarely. The problem has been rapidly improving. Associated symptoms include coughing, diaphoresis, headaches and weakness. Pertinent negatives include no abdominal pain, arthralgias, chest pain, chills, fatigue, fever, joint swelling, myalgias, nausea, rash or vomiting. The symptoms are aggravated by eating. Treatments tried: Zofran. The treatment  provided significant relief.   Difficulty Swallowing   This is a chronic problem. Episode onset: over 3 years. The problem has been gradually improving. Associated symptoms include coughing, diaphoresis, headaches and weakness. Pertinent negatives include no abdominal pain, arthralgias, chest pain, chills, fatigue, fever, joint swelling, myalgias, nausea, rash or vomiting. The symptoms are aggravated by eating and drinking. She has tried nothing for the symptoms.   Diarrhea    This is a chronic problem. Episode onset: over 5 years. Episode frequency: once per month. The problem has been rapidly improving. The stool consistency is described as watery. The patient states that diarrhea does not awaken her from sleep. Associated symptoms include coughing and headaches. Pertinent negatives include no abdominal pain, arthralgias, chills, fever, myalgias or vomiting. Nothing aggravates the symptoms. There are no known risk factors. She has tried nothing for the symptoms.   Anemia   Presents for initial visit. The condition has lasted for 1 year. Symptoms include bruises/bleeds easily and light-headedness. There has been no abdominal pain, fever or palpitations. Signs of blood loss that are not present include hematemesis, hematochezia, melena and vaginal bleeding. Past treatments include nothing. Past medical history includes alcohol abuse. Procedure history includes EGD (1/2018).      Review of Systems   Constitutional: Positive for diaphoresis. Negative for appetite change, chills, fatigue, fever and unexpected weight change.   HENT: Negative for mouth sores, nosebleeds and trouble swallowing.    Eyes: Negative for discharge and redness.   Respiratory: Positive for cough. Negative for apnea and shortness of breath.    Cardiovascular: Positive for leg swelling. Negative for chest pain and palpitations.   Gastrointestinal: Negative for abdominal distention, abdominal pain, anal bleeding, blood in stool, constipation,  diarrhea, hematemesis, hematochezia, melena, nausea and vomiting.   Endocrine: Negative for cold intolerance, heat intolerance and polydipsia.   Genitourinary: Negative for dysuria, hematuria, urgency and vaginal bleeding.   Musculoskeletal: Positive for back pain. Negative for arthralgias, joint swelling and myalgias.   Skin: Negative for rash.   Allergic/Immunologic: Negative for food allergies and immunocompromised state.   Neurological: Positive for weakness, light-headedness and headaches. Negative for dizziness, seizures and syncope.   Hematological: Negative for adenopathy. Bruises/bleeds easily.   Psychiatric/Behavioral: Negative for dysphoric mood. The patient is nervous/anxious. The patient is not hyperactive.      Patient Active Problem List   Diagnosis   • Anemia   • Ovarian cyst   • Cervical radiculopathy   • Non-intractable vomiting with nausea   • HTN (hypertension)   • COPD (chronic obstructive pulmonary disease)   • Anxiety and depression   • MARSHA (acute kidney injury)   • Alcoholism   • Epigastric pain   • Nausea   • Heartburn   • Dysphagia   • Diarrhea   • Abnormal ultrasound of liver   • Esophageal stenosis   • Esophagitis   • Chronic gastritis without bleeding     Past Medical History:   Diagnosis Date   • Acute pancreatitis     X2   • Anemia    • Anemia 2016   • Anxiety    • Asthma    • COPD (chronic obstructive pulmonary disease)    • Depression    • Frequent headaches    • GERD (gastroesophageal reflux disease)    • H/O exercise stress test    • History of blood transfusion    • Hypertension    • Iron deficiency    • Kidney calculi    • Leg pain    • Migraine     1x/month   • Panic attack    • Reflux esophagitis    • Renal failure     ACUTE   • Snores    • Tattoo    • UTI (urinary tract infection)    • Wears glasses      Past Surgical History:   Procedure Laterality Date   •  SECTION     • ENDOSCOPY N/A 2018    Procedure:  ESOPHAGOGASTRODUODENOSCOPY with biopsies with savory wire guided esophageal dilitation ;  Surgeon: Alen Gutierrez MD;  Location: [facility] ENDOSCOPY;  Service:    • FINGER SURGERY  1999    RIGHT PINKY   • HYSTERECTOMY  2000    uterus only   • UPPER GASTROINTESTINAL ENDOSCOPY  2016   • UPPER GASTROINTESTINAL ENDOSCOPY  01/09/2018     Family History   Problem Relation Age of Onset   • Hypertension Mother    • Thyroid disease Mother    • Diabetes Father    • Hypertension Father    • Liver disease Neg Hx    • Colon cancer Neg Hx    • Esophageal cancer Neg Hx      Social History   Substance Use Topics   • Smoking status: Current Every Day Smoker     Packs/day: 0.25     Years: 33.00     Types: Cigarettes     Start date: 1985   • Smokeless tobacco: Never Used      Comment: TRYING TO QUIT WITH HELP OF FMD   • Alcohol use No      Comment: WAS DRINKING A PINT TO 1/5 A DAY.  QUIT 4 AGO.         Current Outpatient Prescriptions:   •  albuterol (PROAIR RESPICLICK) 108 (90 Base) MCG/ACT inhaler, Inhale 2 puffs Every 4 (Four) Hours As Needed for Wheezing., Disp: , Rfl:   •  Aspirin-Acetaminophen-Caffeine (EXCEDRIN MIGRAINE PO), Take 1 tablet by mouth Every 6 (Six) Hours As Needed., Disp: , Rfl:   •  budesonide-formoterol (SYMBICORT) 160-4.5 MCG/ACT inhaler, Inhale 2 puffs 2 (Two) Times a Day., Disp: , Rfl:   •  hydrochlorothiazide (HYDRODIURIL) 12.5 MG tablet, Take 25 mg by mouth Daily As Needed., Disp: , Rfl:   •  O2 (OXYGEN), Inhale 2 L/min Every Night., Disp: , Rfl:   •  ondansetron (ZOFRAN) 8 MG tablet, Take 1 tablet by mouth Every 8 (Eight) Hours., Disp: , Rfl: 0  •  Varenicline Tartrate (CHANTIX PO), Take  by mouth 2 (Two) Times a Day., Disp: , Rfl:   •  buPROPion XL (WELLBUTRIN XL) 150 MG 24 hr tablet, Take 1 tablet by mouth Daily., Disp: , Rfl: 0  •  dexlansoprazole (DEXILANT) 60 MG capsule, 1 po daily at 11 AM, Disp: 30 capsule, Rfl: 5  •  diazePAM (VALIUM) 5 MG tablet, Take 1 tablet by mouth 2 (Two) Times a Day As Needed.,  "Disp: , Rfl: 0  •  disulfiram (ANTABUSE) 500 MG tablet tablet, Take 500 mg by mouth Daily., Disp: , Rfl:   •  lisinopril (PRINIVIL,ZESTRIL) 5 MG tablet, Take 5 mg by mouth Daily., Disp: , Rfl:   •  QUEtiapine (SEROquel) 50 MG tablet, Take 50 mg by mouth At Night As Needed., Disp: , Rfl:     No Known Allergies    /79  Pulse 91  Temp 97.3 °F (36.3 °C)  Resp 15  Ht 167.6 cm (66\")  Wt 80.3 kg (177 lb)  LMP  (LMP Unknown)  BMI 28.57 kg/m2    Physical Exam   Constitutional: She is oriented to person, place, and time. She appears well-developed and well-nourished. No distress.   HENT:   Head: Normocephalic and atraumatic.   Right Ear: Hearing and external ear normal.   Left Ear: Hearing and external ear normal.   Nose: Nose normal.   Mouth/Throat: Oropharynx is clear and moist and mucous membranes are normal. Mucous membranes are not pale, not dry and not cyanotic. No oral lesions. No oropharyngeal exudate.   Eyes: Conjunctivae and EOM are normal. Right eye exhibits no discharge. Left eye exhibits no discharge.   Neck: Trachea normal. Neck supple. No JVD present. No edema present. No thyroid mass and no thyromegaly present.   Cardiovascular: Normal rate, regular rhythm, S2 normal and normal heart sounds.  Exam reveals no gallop, no S3 and no friction rub.    No murmur heard.  Pulmonary/Chest: Effort normal and breath sounds normal. No respiratory distress. She exhibits no tenderness.   Abdominal: Normal appearance and bowel sounds are normal. She exhibits no distension, no ascites and no mass. There is no splenomegaly or hepatomegaly. There is no tenderness. There is no rigidity, no rebound and no guarding. No hernia.       Vascular Status -  Her exam exhibits no right foot edema. Her exam exhibits no left foot edema.  Lymphadenopathy:     She has no cervical adenopathy.        Left: No supraclavicular adenopathy present.   Neurological: She is alert and oriented to person, place, and time. She has normal " strength. No cranial nerve deficit or sensory deficit.   Skin: No rash noted. She is not diaphoretic. No cyanosis. No pallor. Nails show no clubbing.   Psychiatric: She has a normal mood and affect.   Nursing note and vitals reviewed.  Stigmata of chronic liver disease:  None.  Asterixis:  None.    Laboratory Results:  Upon review of records:     Dated 10/5/2017 glucose 168 BUN 24 creatinine 2.0 sodium 135 potassium 5.2 chloride 99 CO2 6.0 calcium 11.0 albumin 5.6 ALT 31 AST 34 upon phosphatase 90 total bilirubin 0.9 WBC 19.35 hemoglobin 15.1 hematocrit 44.3 platelet count 261 MCV 94.5 ethanol <10 lipase 404 lactate 1.0     Dated 10/6/2017 glucose 100 BUN 10 creatinine 0.8 sodium 145 potassium 3.4 chloride 109 CO2 20 calcium 9.8 albumin 4.4 ALT 35 AST 30 1:00 phosphatase 59 total bilirubin 0.5 WBC 6.14 hemoglobin 12.2 hematocrit 35.9 platelet count 163 MCV 96.0 lipase 236 magnesium 2.0     Dated 10/11/2017 glucose 99 BUN 11 creatinine 0.7 sodium 142 potassium 4.2 chloride 105 CO2 26 calcium 9.5 albumin 4.2 ALT 42 AST 29 alkaline phosphatase 56 total bilirubin 0.3 WBC 8.89 hemoglobin 11.2 hematocrit 33.5 platelet count 194 MCV 98.0     Dated 1/25/2018 glucose 78 BUN 6 creatinine 0.7 sodium 143 potassium 3.2 chloride 109 CO2 22 calcium 9.5 albumin 4.4 ALT 26 AST 18 alkaline phosphatase 55 total bilirubin 0.4 WBC 7.7 hemoglobin 12.2 hematocrit 35.9 platelet count 189 MCV 91.6 TSH 2.270 T4 0.86 T3 2.4     Abdominal Imaging:  Upon review of records:     CT of abdomen and pelvis without contrast dated 10/5/2017 reveals Lack of intravenous contrast limits evaluation of the solid organs, the mediastinum, and the vasculature. There is no nephrolithiasis. There is no hydronephrosis. The limited noncontrast images of the liver are normal. The gallbladder is unremarkable. The spleen is normal. No adrenal masses are seen.  The pancreas has an unremarkable unenhanced appearance.. The aorta is normal in caliber.There is no  significant free fluid or adenopathy.  Limited noncontrast images of the bowel are unremarkable. Nodular density within the lateral lingula measuring up to 8 mm, this may be secondary to atelectasis. Follow-up chest CT in 3 months is recommended to prior stability. The appendix is normal. The urinary bladder is unremarkable. There is no significant fluid or adenopathy. The osseous structures are intact. . The uterus is surgically absent.     Ultrasound of gallbladder dated 10/5/2017 reveals Limited images of the pancreas are unremarkable.  The liver parenchyma is fatty infiltrated.  The gallbladder is normal with no shadowing stones or wall thickening.  There is no pericholecystic fluid.  The common duct measures 4.5 mm.  Limited images of the right kidney are unremarkable.     Procedures:    Upon review of records:    EGD dated 1/9/2018 reveals proximal esophageal stenosis.  Status post wire-guided serial dilation to 16 mm using savory dilators.  Erythematous distal esophagitis.  No Campbell's esophagus.  Small sliding hiatal hernia less than 3 cm.  Erythematous gastritis with evidence of old healed ulceration.  Subtle concentric rings were noted within the mid and distal esophagus.  Biopsies were obtained.  Second portion of duodenum biopsy reveals duodenal mucosa with no significant histopathologic abnormality.  Antrum body and annulus biopsy reveals mild reactive gastropathy.  Negative for significant inflammatory infiltrates, intestinal metaplasia, dysplasia or malignancy.  H. pylori negative.  Esophagus biopsies mid and distal reveals squamous mucosa with no significant histopathologic abnormality.  No eosinophils seen.    Notes:  1. History of pancreatitis in August 2016 and October 2017.    Assessment and Plan:    Agatha was seen today for follow-up and heartburn.    Diagnoses and all orders for this visit:    Heartburn  Comments:  No Campbell's. Not controlled with Pantoprazole.  Orders:  -      dexlansoprazole (DEXILANT) 60 MG capsule; 1 po daily at 11 AM    Nausea  Comments:  Improved.    Dysphagia, unspecified type  Comments:  Improving. Likely secondary to esophageal stenosis.    Epigastric pain  Comments:  Improved.    Diarrhea, unspecified type  Comments:  Improved with avoidance of alcohol.  Differentials include microscopic colitis, underlying inflammatory bowel disease.    Anemia, unspecified type  Comments:  History of long-standing anemia.  Etiology unclear.    Esophageal stenosis  Comments:  Dilated to 16 mm.    Esophagitis  Comments:  No Campbell's.    Chronic gastritis without bleeding, unspecified gastritis type  Comments:  Negative for H. pylori.    Abnormal ultrasound of liver  Comments:  Fatty infiltration of liver noted per ultrasound. Of interest, LFT's are normal.        Plan  and Patient Instructions:  Patient Instructions   1. Antireflux measures: Avoid fried, fatty foods, alcohol, chocolate, coffee, tea,  soft drinks, peppermint and spearmint, spicy foods, tomatoes and tomato based foods, onion based foods, and smoking. Other antireflux measures include weight reduction if overweight, avoiding tight clothing around the abdomen, elevating the head of the bed 6 inches with blocks under the head board, and don't drink or eat before going to bed and avoid lying down immediately after meals.  2. Dexilant 60 mg 1 po daily at 11 am.  3. Discontinue Pantoprazole.  4. Zofran 4 mg 1 po every 8 hours as needed for Zofran.  5. Continue to avoid alcohol.  6. Low fat diet, exercise, weight loss.  7. Colonoscopy: Description of the procedure, risks, benefits, alternatives and options, including nonoperative options, were discussed with the patient in detail. The patient understands and wishes to proceed.    LAINA Kyle

## 2018-02-01 ENCOUNTER — PRIOR AUTHORIZATION (OUTPATIENT)
Dept: GASTROENTEROLOGY | Facility: CLINIC | Age: 43
End: 2018-02-01

## 2018-02-01 DIAGNOSIS — R12 HEARTBURN: Primary | ICD-10-CM

## 2018-02-02 PROBLEM — Z12.11 COLON CANCER SCREENING: Status: ACTIVE | Noted: 2018-02-02

## 2018-02-02 RX ORDER — OMEPRAZOLE 40 MG/1
CAPSULE, DELAYED RELEASE ORAL
Qty: 30 CAPSULE | Refills: 5 | Status: SHIPPED | OUTPATIENT
Start: 2018-02-02

## 2018-02-02 NOTE — TELEPHONE ENCOUNTER
PA DENIED FOR DEXILANT. THE DOCUMENTATION DOES NOT MEET GLOBAL NON-FORMULARY CRITERIA. FORMULARY ALTERNATIVES INCLUDE: OMEPRAZOLE, PANTOPRAZOLE, LANSOPRAZOLE RX. LANSOPRAZOLE, PREVACID, OMEPRAZOLE, PRILOSEC, NEXIUM, ZEGERID OTC.

## 2018-02-02 NOTE — TELEPHONE ENCOUNTER
RELAYED TO PATIENT WHO VERBALIZED UNDERSTANDING AND WILL CALL WITH A PROGRESS REPORT IN AROUND A MONTH OF THE OMEPRAZOLE.

## 2018-02-02 NOTE — TELEPHONE ENCOUNTER
Let the patient know the Dexilant is not covered. I electronically scripted Omeprazole to her pharmacy. She is to take 1 capsule in the am 30 minutes before breakfast.

## 2018-02-02 NOTE — TELEPHONE ENCOUNTER
PATIENT HAS TRIED LANSOPRAZOLE, NEXIUM, RANITIDINE, AND PEPCID PER LAST OFFICE NOTE. THE DEXILANT IS DENIED. THEY SAID IN THE LETTER THEY WILL COVER PANTOPRAZOLE OR OMEPRAZOLE RX IF SHE COULD TRY ONE OF THOSE. IF NOT I WILL START AN APPEALS. LET ME KNOW AND I'LL CALL AND LET HER KNOW. THANKS!

## 2018-05-24 ENCOUNTER — TRANSCRIBE ORDERS (OUTPATIENT)
Dept: ADMINISTRATIVE | Facility: HOSPITAL | Age: 43
End: 2018-05-24

## 2018-05-24 DIAGNOSIS — IMO0002 MASS: Primary | ICD-10-CM

## 2018-06-08 ENCOUNTER — HOSPITAL ENCOUNTER (OUTPATIENT)
Dept: ULTRASOUND IMAGING | Facility: HOSPITAL | Age: 43
Discharge: HOME | End: 2018-06-08
Admitting: NURSE PRACTITIONER

## 2018-06-08 DIAGNOSIS — IMO0002 MASS: ICD-10-CM

## 2018-06-08 PROCEDURE — 76881 US COMPL JOINT R-T W/IMG: CPT

## (undated) DEVICE — Device

## (undated) DEVICE — ENDOGATOR AUXILIARY WATER JET CONNECTOR: Brand: ENDOGATOR

## (undated) DEVICE — CONMED SCOPE SAVER BITE BLOCK, 20X27 MM: Brand: SCOPE SAVER

## (undated) DEVICE — 2000CC GUARDIAN II: Brand: GUARDIAN

## (undated) DEVICE — FRCP BIOP COLD ENDOJAW ALLGTR W/NDL 2.8X2300MM BLU